# Patient Record
Sex: MALE | Race: WHITE | NOT HISPANIC OR LATINO
[De-identification: names, ages, dates, MRNs, and addresses within clinical notes are randomized per-mention and may not be internally consistent; named-entity substitution may affect disease eponyms.]

---

## 2019-09-12 ENCOUNTER — RX RENEWAL (OUTPATIENT)
Age: 79
End: 2019-09-12

## 2019-11-11 ENCOUNTER — NON-APPOINTMENT (OUTPATIENT)
Age: 79
End: 2019-11-11

## 2019-11-11 ENCOUNTER — APPOINTMENT (OUTPATIENT)
Dept: INTERNAL MEDICINE | Facility: CLINIC | Age: 79
End: 2019-11-11
Payer: MEDICARE

## 2019-11-11 VITALS — WEIGHT: 175 LBS | HEIGHT: 72 IN | TEMPERATURE: 97.8 F | BODY MASS INDEX: 23.7 KG/M2

## 2019-11-11 VITALS — DIASTOLIC BLOOD PRESSURE: 70 MMHG | SYSTOLIC BLOOD PRESSURE: 120 MMHG

## 2019-11-11 DIAGNOSIS — Z87.891 PERSONAL HISTORY OF NICOTINE DEPENDENCE: ICD-10-CM

## 2019-11-11 PROCEDURE — G0439: CPT

## 2019-11-11 PROCEDURE — 93000 ELECTROCARDIOGRAM COMPLETE: CPT

## 2019-11-11 PROCEDURE — 82270 OCCULT BLOOD FECES: CPT

## 2019-11-11 PROCEDURE — 36415 COLL VENOUS BLD VENIPUNCTURE: CPT

## 2019-11-11 NOTE — HISTORY OF PRESENT ILLNESS
[de-identified] : 79 M for cpe. Tx'ed for BPH borderlne DM gum d and is on asa. Long standing dysphagia w solids on occas only. ROS otherwise neg. The EKG shows LVH and non sp ST-T changes. Will get old stress test

## 2019-11-11 NOTE — HEALTH RISK ASSESSMENT
[With Patient/Caregiver] : With Patient/Caregiver [Relationship: ___] : Relationship: [unfilled] [I will adhere to the patient's wishes as expressed in the advance directive except as noted below.] : I will adhere to the patient's wishes as expressed in the advance directive except as noted below [AdvancecareDate] : 11/11/19

## 2019-11-11 NOTE — PHYSICAL EXAM
[Thin] : thin [Normal Male:] : meatus normal, the bladder was normal on palpation, the scrotum was normal, there were no testicular masses and no prostate nodules. [Normal] : normal gait, coordination grossly intact, no focal deficits [FreeTextEntry1] : guaiac neg/ bph mild [de-identified] : no adenopathy [de-identified] : degen arthritis [de-identified] : multiple sub-q nodules arms and abd.

## 2019-11-13 LAB
ALBUMIN SERPL ELPH-MCNC: 4.3 G/DL
ALP BLD-CCNC: 60 U/L
ALT SERPL-CCNC: 10 U/L
ANION GAP SERPL CALC-SCNC: 13 MMOL/L
APPEARANCE: CLEAR
AST SERPL-CCNC: 14 U/L
BACTERIA: NEGATIVE
BASOPHILS # BLD AUTO: 0.04 K/UL
BASOPHILS NFR BLD AUTO: 0.7 %
BILIRUB SERPL-MCNC: 0.5 MG/DL
BILIRUBIN URINE: NEGATIVE
BLOOD URINE: NEGATIVE
BUN SERPL-MCNC: 21 MG/DL
CALCIUM SERPL-MCNC: 9.5 MG/DL
CHLORIDE SERPL-SCNC: 104 MMOL/L
CHOLEST SERPL-MCNC: 190 MG/DL
CHOLEST/HDLC SERPL: 3.9 RATIO
CO2 SERPL-SCNC: 24 MMOL/L
COLOR: YELLOW
CREAT SERPL-MCNC: 1.16 MG/DL
EOSINOPHIL # BLD AUTO: 0.23 K/UL
EOSINOPHIL NFR BLD AUTO: 4 %
ESTIMATED AVERAGE GLUCOSE: 131 MG/DL
GLUCOSE QUALITATIVE U: NEGATIVE
GLUCOSE SERPL-MCNC: 147 MG/DL
HBA1C MFR BLD HPLC: 6.2 %
HCT VFR BLD CALC: 45.8 %
HDLC SERPL-MCNC: 49 MG/DL
HGB BLD-MCNC: 14.8 G/DL
HYALINE CASTS: 4 /LPF
IMM GRANULOCYTES NFR BLD AUTO: 0.4 %
KETONES URINE: NEGATIVE
LDLC SERPL CALC-MCNC: 125 MG/DL
LEUKOCYTE ESTERASE URINE: NEGATIVE
LYMPHOCYTES # BLD AUTO: 0.81 K/UL
LYMPHOCYTES NFR BLD AUTO: 14.3 %
MAN DIFF?: NORMAL
MCHC RBC-ENTMCNC: 31.1 PG
MCHC RBC-ENTMCNC: 32.3 GM/DL
MCV RBC AUTO: 96.2 FL
MICROSCOPIC-UA: NORMAL
MONOCYTES # BLD AUTO: 0.68 K/UL
MONOCYTES NFR BLD AUTO: 12 %
NEUTROPHILS # BLD AUTO: 3.9 K/UL
NEUTROPHILS NFR BLD AUTO: 68.6 %
NITRITE URINE: NEGATIVE
PH URINE: 6.5
PLATELET # BLD AUTO: 190 K/UL
POTASSIUM SERPL-SCNC: 4.3 MMOL/L
PROT SERPL-MCNC: 6.4 G/DL
PROTEIN URINE: NEGATIVE
RBC # BLD: 4.76 M/UL
RBC # FLD: 12.2 %
RED BLOOD CELLS URINE: 2 /HPF
SODIUM SERPL-SCNC: 141 MMOL/L
SPECIFIC GRAVITY URINE: 1.02
SQUAMOUS EPITHELIAL CELLS: 1 /HPF
TRIGL SERPL-MCNC: 81 MG/DL
UROBILINOGEN URINE: NORMAL
WBC # FLD AUTO: 5.68 K/UL
WHITE BLOOD CELLS URINE: 0 /HPF

## 2019-11-27 ENCOUNTER — APPOINTMENT (OUTPATIENT)
Dept: INTERNAL MEDICINE | Facility: CLINIC | Age: 79
End: 2019-11-27
Payer: MEDICARE

## 2019-11-27 VITALS
TEMPERATURE: 97.9 F | HEIGHT: 72 IN | OXYGEN SATURATION: 95 % | HEART RATE: 67 BPM | BODY MASS INDEX: 22.35 KG/M2 | WEIGHT: 165 LBS

## 2019-11-27 PROCEDURE — 99213 OFFICE O/P EST LOW 20 MIN: CPT

## 2019-11-27 NOTE — PHYSICAL EXAM
[No Acute Distress] : no acute distress [Well Nourished] : well nourished [Well Developed] : well developed [Well-Appearing] : well-appearing [Normal Sclera/Conjunctiva] : normal sclera/conjunctiva [PERRL] : pupils equal round and reactive to light [EOMI] : extraocular movements intact [Normal Outer Ear/Nose] : the outer ears and nose were normal in appearance [Normal Oropharynx] : the oropharynx was normal [No JVD] : no jugular venous distention [No Lymphadenopathy] : no lymphadenopathy [Supple] : supple [Thyroid Normal, No Nodules] : the thyroid was normal and there were no nodules present [No Respiratory Distress] : no respiratory distress  [No Accessory Muscle Use] : no accessory muscle use [Clear to Auscultation] : lungs were clear to auscultation bilaterally [Normal Rate] : normal rate  [Regular Rhythm] : with a regular rhythm [Normal S1, S2] : normal S1 and S2 [No Murmur] : no murmur heard [No Carotid Bruits] : no carotid bruits [No Abdominal Bruit] : a ~M bruit was not heard ~T in the abdomen [No Varicosities] : no varicosities [Pedal Pulses Present] : the pedal pulses are present [No Edema] : there was no peripheral edema [No Palpable Aorta] : no palpable aorta [No Extremity Clubbing/Cyanosis] : no extremity clubbing/cyanosis [Soft] : abdomen soft [Non Tender] : non-tender [Non-distended] : non-distended [No Masses] : no abdominal mass palpated [No HSM] : no HSM [Normal Bowel Sounds] : normal bowel sounds [Normal Posterior Cervical Nodes] : no posterior cervical lymphadenopathy [Normal Anterior Cervical Nodes] : no anterior cervical lymphadenopathy [No CVA Tenderness] : no CVA  tenderness [No Spinal Tenderness] : no spinal tenderness [No Joint Swelling] : no joint swelling [Grossly Normal Strength/Tone] : grossly normal strength/tone [No Rash] : no rash [Coordination Grossly Intact] : coordination grossly intact [No Focal Deficits] : no focal deficits [Normal Gait] : normal gait [Deep Tendon Reflexes (DTR)] : deep tendon reflexes were 2+ and symmetric [Normal Affect] : the affect was normal [Normal Insight/Judgement] : insight and judgment were intact [de-identified] : Multiple large subcutaneous nodules of the abdomen and arms without

## 2019-11-27 NOTE — ASSESSMENT
[FreeTextEntry1] : The patient is a diabetic although he has lost weight and been on a diet and his home sugars are improved markedly. I will start him on Crestor 5 mg at night and side effects of muscle aches and pains and liver were described to the patient and he will take OQ 10 200 mg as well. At this time he does get leg cramps at night

## 2019-11-27 NOTE — HISTORY OF PRESENT ILLNESS
[de-identified] : The patient is an 79-year-old male who has had to have high sugar on physical examination. He is gema baby aspirin daily and we want to put him on a statin. He has checked his sugars at home and been on a die tand his sugars have come down to normal range. I will have him start Crestor 5 mg at nightand be seen in one month

## 2020-01-13 ENCOUNTER — APPOINTMENT (OUTPATIENT)
Dept: INTERNAL MEDICINE | Facility: CLINIC | Age: 80
End: 2020-01-13
Payer: MEDICARE

## 2020-01-13 VITALS
HEIGHT: 72 IN | DIASTOLIC BLOOD PRESSURE: 80 MMHG | TEMPERATURE: 97.6 F | HEART RATE: 56 BPM | BODY MASS INDEX: 22.75 KG/M2 | SYSTOLIC BLOOD PRESSURE: 120 MMHG | WEIGHT: 168 LBS | OXYGEN SATURATION: 92 %

## 2020-01-13 PROCEDURE — 36415 COLL VENOUS BLD VENIPUNCTURE: CPT

## 2020-01-13 PROCEDURE — 99214 OFFICE O/P EST MOD 30 MIN: CPT | Mod: 25

## 2020-01-13 RX ORDER — UBIDECARENONE 200 MG
200 CAPSULE ORAL
Qty: 90 | Refills: 0 | Status: ACTIVE | COMMUNITY
Start: 2020-01-13

## 2020-01-13 NOTE — ASSESSMENT
[FreeTextEntry1] : checking diabetic parameters, checking lipids and CPK The patient is to be doing well on the current regimen and his weight is stabilized at 175. He will see ophthalmology yearly

## 2020-01-13 NOTE — HISTORY OF PRESENT ILLNESS
[de-identified] : 79 M form diabetic ck. Seen by optho--early cataracts, no diabetic change. NO polydypsia,polyuria, diet and wt 175 stable. No CP, palp, syncope,edema ,dyspsnea. No GI or neuro sympt. No0 neuropathy. Sugars 120's. Noleg cramps or mm pain

## 2020-01-13 NOTE — PHYSICAL EXAM
[No Acute Distress] : no acute distress [Well Nourished] : well nourished [Normal Sclera/Conjunctiva] : normal sclera/conjunctiva [Well Developed] : well developed [Well-Appearing] : well-appearing [EOMI] : extraocular movements intact [PERRL] : pupils equal round and reactive to light [Normal Outer Ear/Nose] : the outer ears and nose were normal in appearance [No JVD] : no jugular venous distention [Normal Oropharynx] : the oropharynx was normal [No Lymphadenopathy] : no lymphadenopathy [Supple] : supple [Thyroid Normal, No Nodules] : the thyroid was normal and there were no nodules present [No Respiratory Distress] : no respiratory distress  [No Accessory Muscle Use] : no accessory muscle use [Clear to Auscultation] : lungs were clear to auscultation bilaterally [Normal Rate] : normal rate  [Regular Rhythm] : with a regular rhythm [Normal S1, S2] : normal S1 and S2 [No Murmur] : no murmur heard [No Carotid Bruits] : no carotid bruits [No Abdominal Bruit] : a ~M bruit was not heard ~T in the abdomen [No Varicosities] : no varicosities [Pedal Pulses Present] : the pedal pulses are present [No Edema] : there was no peripheral edema [No Palpable Aorta] : no palpable aorta [No Extremity Clubbing/Cyanosis] : no extremity clubbing/cyanosis [Soft] : abdomen soft [Non Tender] : non-tender [Non-distended] : non-distended [No Masses] : no abdominal mass palpated [No HSM] : no HSM [Normal Bowel Sounds] : normal bowel sounds [Normal Posterior Cervical Nodes] : no posterior cervical lymphadenopathy [Normal Anterior Cervical Nodes] : no anterior cervical lymphadenopathy [No CVA Tenderness] : no CVA  tenderness [No Spinal Tenderness] : no spinal tenderness [No Joint Swelling] : no joint swelling [No Rash] : no rash [Grossly Normal Strength/Tone] : grossly normal strength/tone [No Focal Deficits] : no focal deficits [Coordination Grossly Intact] : coordination grossly intact [Normal Gait] : normal gait [Normal Affect] : the affect was normal [Deep Tendon Reflexes (DTR)] : deep tendon reflexes were 2+ and symmetric [Normal Insight/Judgement] : insight and judgment were intact [Normal] : affect was normal and insight and judgment were intact [de-identified] : all neg but 0/;0 reflexes at ankles [FreeTextEntry1] : deferred [de-identified] : boweling finger deformed

## 2020-01-14 LAB
ALBUMIN SERPL ELPH-MCNC: 4.5 G/DL
ALP BLD-CCNC: 71 U/L
ALT SERPL-CCNC: 13 U/L
ANION GAP SERPL CALC-SCNC: 12 MMOL/L
APPEARANCE: CLEAR
AST SERPL-CCNC: 18 U/L
BACTERIA: NEGATIVE
BASOPHILS # BLD AUTO: 0.04 K/UL
BASOPHILS NFR BLD AUTO: 0.6 %
BILIRUB SERPL-MCNC: 0.9 MG/DL
BILIRUBIN URINE: NEGATIVE
BLOOD URINE: NORMAL
BUN SERPL-MCNC: 23 MG/DL
CALCIUM SERPL-MCNC: 9.6 MG/DL
CHLORIDE SERPL-SCNC: 104 MMOL/L
CHOLEST SERPL-MCNC: 120 MG/DL
CHOLEST/HDLC SERPL: 2.6 RATIO
CK SERPL-CCNC: 128 U/L
CO2 SERPL-SCNC: 25 MMOL/L
COLOR: YELLOW
CREAT SERPL-MCNC: 1.2 MG/DL
EOSINOPHIL # BLD AUTO: 0.15 K/UL
EOSINOPHIL NFR BLD AUTO: 2.3 %
ESTIMATED AVERAGE GLUCOSE: 134 MG/DL
GLUCOSE QUALITATIVE U: NEGATIVE
GLUCOSE SERPL-MCNC: 126 MG/DL
HBA1C MFR BLD HPLC: 6.3 %
HCT VFR BLD CALC: 46.6 %
HDLC SERPL-MCNC: 46 MG/DL
HGB BLD-MCNC: 15.5 G/DL
HYALINE CASTS: 2 /LPF
IMM GRANULOCYTES NFR BLD AUTO: 0.2 %
KETONES URINE: ABNORMAL
LDLC SERPL CALC-MCNC: 59 MG/DL
LEUKOCYTE ESTERASE URINE: NEGATIVE
LYMPHOCYTES # BLD AUTO: 0.95 K/UL
LYMPHOCYTES NFR BLD AUTO: 14.4 %
MAN DIFF?: NORMAL
MCHC RBC-ENTMCNC: 31.8 PG
MCHC RBC-ENTMCNC: 33.3 GM/DL
MCV RBC AUTO: 95.5 FL
MICROSCOPIC-UA: NORMAL
MONOCYTES # BLD AUTO: 0.67 K/UL
MONOCYTES NFR BLD AUTO: 10.2 %
NEUTROPHILS # BLD AUTO: 4.76 K/UL
NEUTROPHILS NFR BLD AUTO: 72.3 %
NITRITE URINE: NEGATIVE
PH URINE: 6
PLATELET # BLD AUTO: 195 K/UL
POTASSIUM SERPL-SCNC: 4.5 MMOL/L
PROT SERPL-MCNC: 6.7 G/DL
PROTEIN URINE: NEGATIVE
RBC # BLD: 4.88 M/UL
RBC # FLD: 12.5 %
RED BLOOD CELLS URINE: 3 /HPF
SODIUM SERPL-SCNC: 141 MMOL/L
SPECIFIC GRAVITY URINE: 1.02
SQUAMOUS EPITHELIAL CELLS: 2 /HPF
TRIGL SERPL-MCNC: 75 MG/DL
UROBILINOGEN URINE: NORMAL
WBC # FLD AUTO: 6.58 K/UL
WHITE BLOOD CELLS URINE: 1 /HPF

## 2020-04-15 DIAGNOSIS — Z83.3 FAMILY HISTORY OF DIABETES MELLITUS: ICD-10-CM

## 2020-07-15 ENCOUNTER — RX RENEWAL (OUTPATIENT)
Age: 80
End: 2020-07-15

## 2020-11-12 ENCOUNTER — NON-APPOINTMENT (OUTPATIENT)
Age: 80
End: 2020-11-12

## 2020-11-12 ENCOUNTER — APPOINTMENT (OUTPATIENT)
Dept: INTERNAL MEDICINE | Facility: CLINIC | Age: 80
End: 2020-11-12
Payer: MEDICARE

## 2020-11-12 VITALS
HEIGHT: 72 IN | HEART RATE: 57 BPM | WEIGHT: 166 LBS | BODY MASS INDEX: 22.48 KG/M2 | TEMPERATURE: 97.9 F | OXYGEN SATURATION: 96 %

## 2020-11-12 VITALS — SYSTOLIC BLOOD PRESSURE: 138 MMHG | DIASTOLIC BLOOD PRESSURE: 60 MMHG

## 2020-11-12 DIAGNOSIS — Z23 ENCOUNTER FOR IMMUNIZATION: ICD-10-CM

## 2020-11-12 PROCEDURE — 82270 OCCULT BLOOD FECES: CPT

## 2020-11-12 PROCEDURE — G0439: CPT

## 2020-11-12 PROCEDURE — 93000 ELECTROCARDIOGRAM COMPLETE: CPT

## 2020-11-12 PROCEDURE — 36415 COLL VENOUS BLD VENIPUNCTURE: CPT

## 2020-11-12 NOTE — HISTORY OF PRESENT ILLNESS
[de-identified] : 80 M for CPE. Borderline glycemia, hyperlipidemia, BPH - has otherwise been healthy. Checking sugar to 140 but not fasting. No 3 P"s. Active with boweling, model planes, fishing. No CP sympt. GI neg.  -nocturria and mild decreased stream. Wt loss over a # of yrs, now stabilized over 6 mon. All vaccinations. Feels well

## 2020-11-12 NOTE — ASSESSMENT
[FreeTextEntry1] : Pt will get a # of home BP reading as was labile between 160/80 -140/80. Ck'ing glu HgbA1c, lipids. Ekg sinus donna otherwise normal.

## 2020-11-12 NOTE — PHYSICAL EXAM
[Thin] : thin [Normal Male:] : meatus normal, the bladder was normal on palpation, the scrotum was normal, there were no testicular masses and no prostate nodules. [Normal] : normal gait, coordination grossly intact, no focal deficits [Comprehensive Foot Exam Normal] : Right and left foot were examined and both feet are normal. No ulcers in either foot. Toes are normal and with full ROM.  Normal tactile sensation with monofilament testing throughout both feet [de-identified] : vi [FreeTextEntry1] : guaiac neg, no masses, prostate mildly enlarged w/o nodules [de-identified] : JOAQUIN ferrer [de-identified] : neg [de-identified] : neg [de-identified] : multiple large sub -q nodules arms and abd -unchanged soft regular [de-identified] : normal affect [de-identified] : callus sole R foot

## 2020-11-13 LAB
ALBUMIN SERPL ELPH-MCNC: 4.4 G/DL
ALP BLD-CCNC: 60 U/L
ALT SERPL-CCNC: 10 U/L
ANION GAP SERPL CALC-SCNC: 10 MMOL/L
APPEARANCE: CLEAR
AST SERPL-CCNC: 18 U/L
BACTERIA: NEGATIVE
BASOPHILS # BLD AUTO: 0.02 K/UL
BASOPHILS NFR BLD AUTO: 0.4 %
BILIRUB SERPL-MCNC: 0.7 MG/DL
BILIRUBIN URINE: NEGATIVE
BLOOD URINE: NEGATIVE
BUN SERPL-MCNC: 23 MG/DL
CALCIUM SERPL-MCNC: 9.1 MG/DL
CHLORIDE SERPL-SCNC: 103 MMOL/L
CHOLEST SERPL-MCNC: 147 MG/DL
CK SERPL-CCNC: 121 U/L
CO2 SERPL-SCNC: 27 MMOL/L
COLOR: YELLOW
CREAT SERPL-MCNC: 0.95 MG/DL
CRP SERPL HS-MCNC: 2.92 MG/L
EOSINOPHIL # BLD AUTO: 0.16 K/UL
EOSINOPHIL NFR BLD AUTO: 3.4 %
ESTIMATED AVERAGE GLUCOSE: 123 MG/DL
GLUCOSE QUALITATIVE U: NEGATIVE
GLUCOSE SERPL-MCNC: 127 MG/DL
HBA1C MFR BLD HPLC: 5.9 %
HCT VFR BLD CALC: 46 %
HDLC SERPL-MCNC: 56 MG/DL
HGB BLD-MCNC: 14.8 G/DL
HYALINE CASTS: 0 /LPF
IMM GRANULOCYTES NFR BLD AUTO: 0.2 %
KETONES URINE: NEGATIVE
LDLC SERPL CALC-MCNC: 80 MG/DL
LEUKOCYTE ESTERASE URINE: NEGATIVE
LYMPHOCYTES # BLD AUTO: 0.9 K/UL
LYMPHOCYTES NFR BLD AUTO: 19.1 %
MAN DIFF?: NORMAL
MCHC RBC-ENTMCNC: 32.2 GM/DL
MCHC RBC-ENTMCNC: 32.2 PG
MCV RBC AUTO: 100 FL
MICROSCOPIC-UA: NORMAL
MONOCYTES # BLD AUTO: 0.56 K/UL
MONOCYTES NFR BLD AUTO: 11.9 %
NEUTROPHILS # BLD AUTO: 3.06 K/UL
NEUTROPHILS NFR BLD AUTO: 65 %
NITRITE URINE: NEGATIVE
NONHDLC SERPL-MCNC: 92 MG/DL
PH URINE: 7.5
PLATELET # BLD AUTO: 166 K/UL
POTASSIUM SERPL-SCNC: 5.1 MMOL/L
PROT SERPL-MCNC: 6.4 G/DL
PROTEIN URINE: NEGATIVE
RBC # BLD: 4.6 M/UL
RBC # FLD: 12.5 %
RED BLOOD CELLS URINE: 5 /HPF
SODIUM SERPL-SCNC: 140 MMOL/L
SPECIFIC GRAVITY URINE: 1.02
SQUAMOUS EPITHELIAL CELLS: 0 /HPF
T4 SERPL-MCNC: 4.8 UG/DL
TRIGL SERPL-MCNC: 60 MG/DL
TSH SERPL-ACNC: 2.48 UIU/ML
UROBILINOGEN URINE: ABNORMAL
WBC # FLD AUTO: 4.71 K/UL
WHITE BLOOD CELLS URINE: 0 /HPF

## 2021-06-09 ENCOUNTER — RX RENEWAL (OUTPATIENT)
Age: 81
End: 2021-06-09

## 2021-07-29 ENCOUNTER — NON-APPOINTMENT (OUTPATIENT)
Age: 81
End: 2021-07-29

## 2021-07-29 ENCOUNTER — APPOINTMENT (OUTPATIENT)
Dept: INTERNAL MEDICINE | Facility: CLINIC | Age: 81
End: 2021-07-29
Payer: MEDICARE

## 2021-07-29 VITALS
OXYGEN SATURATION: 98 % | WEIGHT: 165 LBS | DIASTOLIC BLOOD PRESSURE: 70 MMHG | HEIGHT: 72 IN | BODY MASS INDEX: 22.35 KG/M2 | TEMPERATURE: 97.6 F | HEART RATE: 52 BPM | SYSTOLIC BLOOD PRESSURE: 120 MMHG

## 2021-07-29 DIAGNOSIS — Z01.818 ENCOUNTER FOR OTHER PREPROCEDURAL EXAMINATION: ICD-10-CM

## 2021-07-29 PROCEDURE — 99215 OFFICE O/P EST HI 40 MIN: CPT | Mod: 25

## 2021-07-29 PROCEDURE — 36415 COLL VENOUS BLD VENIPUNCTURE: CPT

## 2021-07-29 PROCEDURE — 93000 ELECTROCARDIOGRAM COMPLETE: CPT

## 2021-07-29 NOTE — PHYSICAL EXAM
[Normal Male:] : meatus normal, the bladder was normal on palpation, the scrotum was normal, there were no testicular masses and no prostate nodules. [Normal] : normal gait, coordination grossly intact, no focal deficits [Comprehensive Foot Exam Normal] : Right and left foot were examined and both feet are normal. No ulcers in either foot. Toes are normal and with full ROM.  Normal tactile sensation with monofilament testing throughout both feet [FreeTextEntry1] : Guaiac negative the prostate is enlarged but there are no nodules [de-identified] : Adenopathy [de-identified] : Thyroid is not palpable [de-identified] : No subcutaneous nodules of the trunk and arms

## 2021-07-29 NOTE — ASSESSMENT
[Patient Optimized for Surgery] : Patient optimized for surgery [ECG] : ECG [Modify anti-platelet treatment prior to procedure] : Modify anti-platelet treatment prior to procedure [Continue medications as is] : Continue current medications [FreeTextEntry4] : No surgeries.  He is to take all medications except for aspirin which we will hold for 1 week.  He will do the same for over-the-counter medications and vitamins. [FreeTextEntry6] : Vascular: Weak [FreeTextEntry7] : Follow-up with a of surgery

## 2021-07-29 NOTE — HISTORY OF PRESENT ILLNESS
[No Pertinent Cardiac History] : no history of aortic stenosis, atrial fibrillation, coronary artery disease, recent myocardial infarction, or implantable device/pacemaker [No Pertinent Pulmonary History] : no history of asthma, COPD, sleep apnea, or smoking [No Adverse Anesthesia Reaction] : no adverse anesthesia reaction in self or family member [Diabetes] : diabetes [Chronic Anticoagulation] : no chronic anticoagulation [Chronic Kidney Disease] : no chronic kidney disease [FreeTextEntry1] : Cataracts [FreeTextEntry4] : The patient is an 81-year-old male comes in for preop clearance for cataract.  A form was filled out for [facility].  Patient is asymptomatic [FreeTextEntry5] : Patient has mild diabetes and has no medications and has no polydipsia polyuria polyphagia [FreeTextEntry7] : EKG

## 2021-07-30 LAB
ESTIMATED AVERAGE GLUCOSE: 128 MG/DL
HBA1C MFR BLD HPLC: 6.1 %

## 2021-10-21 ENCOUNTER — RX RENEWAL (OUTPATIENT)
Age: 81
End: 2021-10-21

## 2021-11-17 ENCOUNTER — APPOINTMENT (OUTPATIENT)
Dept: INTERNAL MEDICINE | Facility: CLINIC | Age: 81
End: 2021-11-17
Payer: MEDICARE

## 2021-11-17 ENCOUNTER — NON-APPOINTMENT (OUTPATIENT)
Age: 81
End: 2021-11-17

## 2021-11-17 VITALS
SYSTOLIC BLOOD PRESSURE: 125 MMHG | HEIGHT: 72 IN | BODY MASS INDEX: 22.08 KG/M2 | TEMPERATURE: 97.9 F | DIASTOLIC BLOOD PRESSURE: 75 MMHG | WEIGHT: 163 LBS | OXYGEN SATURATION: 94 % | HEART RATE: 54 BPM

## 2021-11-17 DIAGNOSIS — R73.9 HYPERGLYCEMIA, UNSPECIFIED: ICD-10-CM

## 2021-11-17 PROCEDURE — G0439: CPT

## 2021-11-17 PROCEDURE — 93000 ELECTROCARDIOGRAM COMPLETE: CPT | Mod: 59

## 2021-11-17 PROCEDURE — 36415 COLL VENOUS BLD VENIPUNCTURE: CPT

## 2021-11-17 NOTE — PHYSICAL EXAM
[Thin] : thin [Normal Male:] : meatus normal, the bladder was normal on palpation, the scrotum was normal, there were no testicular masses and no prostate nodules. [Normal] : normal gait, coordination grossly intact, no focal deficits [FreeTextEntry1] : Guaiac is negative prostate is large but there are no nodules [de-identified] : Negative [de-identified] : Negative [de-identified] : Osteoarthritic or injury deformity of the fingers/multiple subcu nodules unchanged throughout his upper arms and abdomen [de-identified] : Subcu nodule see above

## 2021-11-17 NOTE — ASSESSMENT
[FreeTextEntry1] : The patient is found to be in good health we are checking his sugar hemoglobin A1c cholesterol CPK and liver functions as well as all screening tests EKG is done and shows sinus bradycardia and poor R wave progression but no change from previous cardiograms.  He is up-to-date on all vaccinations.  He appears to be doing well at this time

## 2021-11-17 NOTE — HISTORY OF PRESENT ILLNESS
[de-identified] : The patient is an 81-year-old male who comes in for complete physical examination.  He has been treated for hyperlipidemia as well as BPH but otherwise has been in good health.  He has symptoms of nocturia and hesitancy especially in the morning but no infection or hematuria.  He is moderately active flying lateral planes and bowling and has no chest pain palpitations swelling fainting or dyspnea.  He has no GI symptoms and specifically denies change of bowel blood in his stool or melena.  He has no focalizing weakness he is up-to-date on all immunizations.  He is generally feeling well but does note that his hearing has decreased.  He has not been checking his sugars but has been watching his diet and his weight is stable at 165

## 2021-11-18 LAB
ALBUMIN SERPL ELPH-MCNC: 4.5 G/DL
ALP BLD-CCNC: 63 U/L
ALT SERPL-CCNC: 11 U/L
ANION GAP SERPL CALC-SCNC: 14 MMOL/L
APPEARANCE: CLEAR
AST SERPL-CCNC: 17 U/L
BACTERIA: NEGATIVE
BASOPHILS # BLD AUTO: 0.03 K/UL
BASOPHILS NFR BLD AUTO: 0.4 %
BILIRUB SERPL-MCNC: 0.6 MG/DL
BILIRUBIN URINE: NEGATIVE
BLOOD URINE: NEGATIVE
BUN SERPL-MCNC: 21 MG/DL
CALCIUM SERPL-MCNC: 9.3 MG/DL
CHLORIDE SERPL-SCNC: 104 MMOL/L
CHOLEST SERPL-MCNC: 138 MG/DL
CK SERPL-CCNC: 102 U/L
CO2 SERPL-SCNC: 23 MMOL/L
COLOR: YELLOW
CREAT SERPL-MCNC: 1.1 MG/DL
CRP SERPL HS-MCNC: 2.02 MG/L
EOSINOPHIL # BLD AUTO: 0.12 K/UL
EOSINOPHIL NFR BLD AUTO: 1.7 %
ESTIMATED AVERAGE GLUCOSE: 126 MG/DL
GLUCOSE QUALITATIVE U: NEGATIVE
GLUCOSE SERPL-MCNC: 139 MG/DL
HBA1C MFR BLD HPLC: 6 %
HCT VFR BLD CALC: 44.8 %
HDLC SERPL-MCNC: 54 MG/DL
HGB BLD-MCNC: 14.4 G/DL
HYALINE CASTS: 0 /LPF
IMM GRANULOCYTES NFR BLD AUTO: 0.3 %
KETONES URINE: NEGATIVE
LDLC SERPL CALC-MCNC: 69 MG/DL
LEUKOCYTE ESTERASE URINE: NEGATIVE
LYMPHOCYTES # BLD AUTO: 0.82 K/UL
LYMPHOCYTES NFR BLD AUTO: 11.5 %
MAN DIFF?: NORMAL
MCHC RBC-ENTMCNC: 32.1 GM/DL
MCHC RBC-ENTMCNC: 32.1 PG
MCV RBC AUTO: 99.8 FL
MICROSCOPIC-UA: NORMAL
MONOCYTES # BLD AUTO: 0.85 K/UL
MONOCYTES NFR BLD AUTO: 11.9 %
NEUTROPHILS # BLD AUTO: 5.28 K/UL
NEUTROPHILS NFR BLD AUTO: 74.2 %
NITRITE URINE: NEGATIVE
NONHDLC SERPL-MCNC: 84 MG/DL
PH URINE: 7
PLATELET # BLD AUTO: 185 K/UL
POTASSIUM SERPL-SCNC: 4.4 MMOL/L
PROT SERPL-MCNC: 6.4 G/DL
PROTEIN URINE: NORMAL
RBC # BLD: 4.49 M/UL
RBC # FLD: 12.7 %
RED BLOOD CELLS URINE: 5 /HPF
SODIUM SERPL-SCNC: 140 MMOL/L
SPECIFIC GRAVITY URINE: 1.02
SQUAMOUS EPITHELIAL CELLS: 3 /HPF
TRIGL SERPL-MCNC: 77 MG/DL
TSH SERPL-ACNC: 3.59 UIU/ML
UROBILINOGEN URINE: ABNORMAL
WBC # FLD AUTO: 7.12 K/UL
WHITE BLOOD CELLS URINE: 1 /HPF

## 2021-11-29 DIAGNOSIS — J40 BRONCHITIS, NOT SPECIFIED AS ACUTE OR CHRONIC: ICD-10-CM

## 2022-04-21 ENCOUNTER — EMERGENCY (EMERGENCY)
Facility: HOSPITAL | Age: 82
LOS: 1 days | Discharge: HOME | End: 2022-04-21
Attending: EMERGENCY MEDICINE
Payer: MEDICARE

## 2022-04-21 VITALS
WEIGHT: 175.05 LBS | DIASTOLIC BLOOD PRESSURE: 91 MMHG | RESPIRATION RATE: 22 BRPM | SYSTOLIC BLOOD PRESSURE: 206 MMHG | HEART RATE: 87 BPM | OXYGEN SATURATION: 97 % | TEMPERATURE: 98 F

## 2022-04-21 VITALS
OXYGEN SATURATION: 97 % | RESPIRATION RATE: 18 BRPM | SYSTOLIC BLOOD PRESSURE: 169 MMHG | DIASTOLIC BLOOD PRESSURE: 84 MMHG | HEART RATE: 69 BPM | TEMPERATURE: 99 F

## 2022-04-21 DIAGNOSIS — Z98.89 OTHER SPECIFIED POSTPROCEDURAL STATES: Chronic | ICD-10-CM

## 2022-04-21 LAB
ALBUMIN SERPL ELPH-MCNC: 4.5 G/DL — SIGNIFICANT CHANGE UP (ref 3.3–5)
ALP SERPL-CCNC: 68 U/L — SIGNIFICANT CHANGE UP (ref 40–120)
ALT FLD-CCNC: 11 U/L — SIGNIFICANT CHANGE UP (ref 10–45)
ANION GAP SERPL CALC-SCNC: 17 MMOL/L — SIGNIFICANT CHANGE UP (ref 5–17)
APPEARANCE UR: CLEAR — SIGNIFICANT CHANGE UP
AST SERPL-CCNC: 24 U/L — SIGNIFICANT CHANGE UP (ref 10–40)
BACTERIA # UR AUTO: NEGATIVE — SIGNIFICANT CHANGE UP
BASOPHILS # BLD AUTO: 0.02 K/UL — SIGNIFICANT CHANGE UP (ref 0–0.2)
BASOPHILS NFR BLD AUTO: 0.3 % — SIGNIFICANT CHANGE UP (ref 0–2)
BILIRUB SERPL-MCNC: 0.8 MG/DL — SIGNIFICANT CHANGE UP (ref 0.2–1.2)
BILIRUB UR-MCNC: NEGATIVE — SIGNIFICANT CHANGE UP
BUN SERPL-MCNC: 22 MG/DL — SIGNIFICANT CHANGE UP (ref 7–23)
CALCIUM SERPL-MCNC: 9.5 MG/DL — SIGNIFICANT CHANGE UP (ref 8.4–10.5)
CHLORIDE SERPL-SCNC: 101 MMOL/L — SIGNIFICANT CHANGE UP (ref 96–108)
CO2 SERPL-SCNC: 20 MMOL/L — LOW (ref 22–31)
COLOR SPEC: SIGNIFICANT CHANGE UP
CREAT SERPL-MCNC: 1.14 MG/DL — SIGNIFICANT CHANGE UP (ref 0.5–1.3)
DIFF PNL FLD: ABNORMAL
EGFR: 64 ML/MIN/1.73M2 — SIGNIFICANT CHANGE UP
EOSINOPHIL # BLD AUTO: 0.01 K/UL — SIGNIFICANT CHANGE UP (ref 0–0.5)
EOSINOPHIL NFR BLD AUTO: 0.1 % — SIGNIFICANT CHANGE UP (ref 0–6)
EPI CELLS # UR: 0 /HPF — SIGNIFICANT CHANGE UP
GLUCOSE SERPL-MCNC: 170 MG/DL — HIGH (ref 70–99)
GLUCOSE UR QL: ABNORMAL
HCT VFR BLD CALC: 41.4 % — SIGNIFICANT CHANGE UP (ref 39–50)
HGB BLD-MCNC: 14.2 G/DL — SIGNIFICANT CHANGE UP (ref 13–17)
HYALINE CASTS # UR AUTO: 0 /LPF — SIGNIFICANT CHANGE UP (ref 0–2)
IMM GRANULOCYTES NFR BLD AUTO: 0.4 % — SIGNIFICANT CHANGE UP (ref 0–1.5)
KETONES UR-MCNC: ABNORMAL
LEUKOCYTE ESTERASE UR-ACNC: NEGATIVE — SIGNIFICANT CHANGE UP
LYMPHOCYTES # BLD AUTO: 0.43 K/UL — LOW (ref 1–3.3)
LYMPHOCYTES # BLD AUTO: 5.6 % — LOW (ref 13–44)
MCHC RBC-ENTMCNC: 32 PG — SIGNIFICANT CHANGE UP (ref 27–34)
MCHC RBC-ENTMCNC: 34.3 GM/DL — SIGNIFICANT CHANGE UP (ref 32–36)
MCV RBC AUTO: 93.2 FL — SIGNIFICANT CHANGE UP (ref 80–100)
MONOCYTES # BLD AUTO: 0.41 K/UL — SIGNIFICANT CHANGE UP (ref 0–0.9)
MONOCYTES NFR BLD AUTO: 5.4 % — SIGNIFICANT CHANGE UP (ref 2–14)
NEUTROPHILS # BLD AUTO: 6.73 K/UL — SIGNIFICANT CHANGE UP (ref 1.8–7.4)
NEUTROPHILS NFR BLD AUTO: 88.2 % — HIGH (ref 43–77)
NITRITE UR-MCNC: NEGATIVE — SIGNIFICANT CHANGE UP
NRBC # BLD: 0 /100 WBCS — SIGNIFICANT CHANGE UP (ref 0–0)
PH UR: 6.5 — SIGNIFICANT CHANGE UP (ref 5–8)
PLATELET # BLD AUTO: 179 K/UL — SIGNIFICANT CHANGE UP (ref 150–400)
POTASSIUM SERPL-MCNC: 4.2 MMOL/L — SIGNIFICANT CHANGE UP (ref 3.5–5.3)
POTASSIUM SERPL-SCNC: 4.2 MMOL/L — SIGNIFICANT CHANGE UP (ref 3.5–5.3)
PROT SERPL-MCNC: 6.9 G/DL — SIGNIFICANT CHANGE UP (ref 6–8.3)
PROT UR-MCNC: NEGATIVE — SIGNIFICANT CHANGE UP
RBC # BLD: 4.44 M/UL — SIGNIFICANT CHANGE UP (ref 4.2–5.8)
RBC # FLD: 11.9 % — SIGNIFICANT CHANGE UP (ref 10.3–14.5)
RBC CASTS # UR COMP ASSIST: 28 /HPF — HIGH (ref 0–4)
SODIUM SERPL-SCNC: 138 MMOL/L — SIGNIFICANT CHANGE UP (ref 135–145)
SP GR SPEC: 1.01 — SIGNIFICANT CHANGE UP (ref 1.01–1.02)
UROBILINOGEN FLD QL: NEGATIVE — SIGNIFICANT CHANGE UP
WBC # BLD: 7.63 K/UL — SIGNIFICANT CHANGE UP (ref 3.8–10.5)
WBC # FLD AUTO: 7.63 K/UL — SIGNIFICANT CHANGE UP (ref 3.8–10.5)
WBC UR QL: 0 /HPF — SIGNIFICANT CHANGE UP (ref 0–5)

## 2022-04-21 PROCEDURE — 51702 INSERT TEMP BLADDER CATH: CPT

## 2022-04-21 PROCEDURE — 99283 EMERGENCY DEPT VISIT LOW MDM: CPT | Mod: 25

## 2022-04-21 PROCEDURE — 87086 URINE CULTURE/COLONY COUNT: CPT

## 2022-04-21 PROCEDURE — 36415 COLL VENOUS BLD VENIPUNCTURE: CPT

## 2022-04-21 PROCEDURE — 80053 COMPREHEN METABOLIC PANEL: CPT

## 2022-04-21 PROCEDURE — 85025 COMPLETE CBC W/AUTO DIFF WBC: CPT

## 2022-04-21 PROCEDURE — 81001 URINALYSIS AUTO W/SCOPE: CPT

## 2022-04-21 PROCEDURE — 99284 EMERGENCY DEPT VISIT MOD MDM: CPT | Mod: 25,GC

## 2022-04-21 NOTE — ED PROVIDER NOTE - NSFOLLOWUPINSTRUCTIONS_ED_ALL_ED_FT
See UROLOGY CLINIC (rapid referral) this week for follow up -- call to discuss.    Stay well hydrated, eat regular healthy meals, get plenty of rest, continue current medications.    See [facility] information and return instructions given to you.    Seek immediate medical care for new/worsening symptoms/concerns.

## 2022-04-21 NOTE — ED PROVIDER NOTE - CARE PLAN
Principal Discharge DX:	Acute urinary obstruction   1 Principal Discharge DX:	Acute urinary obstruction  Secondary Diagnosis:	Asymptomatic hypertension

## 2022-04-21 NOTE — ED PROVIDER NOTE - ATTENDING CONTRIBUTION TO CARE
------------ATTENDING NOTE------------  pt w/ daughter c/o being unable to urinate since last night, pt has BPH and baseline hesitancy, no new medications/changes, no recent fevers/illness, c/o gradual onset of moderate dull suprapubic pressure since urinary retention relieved w/ pham catheter, awaiting labs and close reassessments -->  - Cristian Fischer MD   ------------------------------------------------

## 2022-04-21 NOTE — ED PROVIDER NOTE - NS ED ROS FT
Constitutional: no fevers, chills  HEENT: no cough, rhinorrhea  Cardiac: no chest pain, palpitations  Respiratory: no SOB  GI: no n/v, abd pain, bloody or dark stools  : difficulty urinating. no dysuria, frequency, or hematuria  MSK: no joint pain  Skin: no rashes  Neuro: no headache, change in vision, focal weakness  Psych: negative

## 2022-04-21 NOTE — ED PROVIDER NOTE - PHYSICAL EXAMINATION
General: non-toxic, NAD  HEENT: NCAT, PERRL  Cardiac: RRR, no murmurs, 2+ peripheral pulses  Chest: CTAB  Abdomen: soft, non-distended, bowel sounds present, no ttp, no rebound or guarding  Extremities: chronic varicose veins visible on both legs. no peripheral edema, calf tenderness, or leg size discrepancies  Skin: no rashes  Neuro: AAOx4, 5+motor, sensory grossly intact  Psych: mood and affect appropriate

## 2022-04-21 NOTE — ED PROVIDER NOTE - PATIENT PORTAL LINK FT
You can access the FollowMyHealth Patient Portal offered by [facility] by registering at the following website: [email or web address]. By joining [employer]s FollowMyHealth portal, you will also be able to view your health information using other applications (apps) compatible with our system.

## 2022-04-21 NOTE — ED PROVIDER NOTE - OBJECTIVE STATEMENT
83yo M pmhx of BPH, HLD comes to ED w/ difficulty urinating. Their pain/symptom is moderate to severe, located in lower abdomen, constant, non mediating with rest. Started randomly at 1900 yesterday 4/20/22. Denies fever, nausea, vomiting, dysuria, hematuria, urinary frequency, recent illness.

## 2022-04-21 NOTE — ED ADULT NURSE NOTE - NSIMPLEMENTINTERV_GEN_ALL_ED
Implemented All Fall Risk Interventions:  [place] to call system. Call bell, personal items and telephone within reach. Instruct patient to call for assistance. Room bathroom lighting operational. Non-slip footwear when patient is off stretcher. Physically safe environment: no spills, clutter or unnecessary equipment. Stretcher in lowest position, wheels locked, appropriate side rails in place. Provide visual cue, wrist band, yellow gown, etc. Monitor gait and stability. Monitor for mental status changes and reorient to person, place, and time. Review medications for side effects contributing to fall risk. Reinforce activity limits and safety measures with patient and family.

## 2022-04-21 NOTE — ED PROVIDER NOTE - PROGRESS NOTE DETAILS
Patient reports improvement on symptoms with catheter. Spoke to patient about results and lack of immediate emergent pathology at this time. Plan to discharge patient. Patient given urology follow up and return precautions. Patient agrees with plan.

## 2022-04-21 NOTE — ED PROVIDER NOTE - NSFOLLOWUPCLINICS_GEN_ALL_ED_FT
[facility] - Urology  Urology  [address], [address] [place] [address]  Phone: [phone]  Fax:     [facility]  Urology  [address] [place] [address] - [address] [place] [address]  Phone: [phone]  Fax:

## 2022-04-22 ENCOUNTER — APPOINTMENT (OUTPATIENT)
Dept: UROLOGY | Facility: CLINIC | Age: 82
End: 2022-04-22

## 2022-04-22 ENCOUNTER — NON-APPOINTMENT (OUTPATIENT)
Age: 82
End: 2022-04-22

## 2022-04-22 LAB
CULTURE RESULTS: NO GROWTH — SIGNIFICANT CHANGE UP
SPECIMEN SOURCE: SIGNIFICANT CHANGE UP

## 2022-04-22 RX ORDER — AZITHROMYCIN 250 MG/1
250 TABLET, FILM COATED ORAL
Qty: 1 | Refills: 1 | Status: COMPLETED | COMMUNITY
Start: 2021-11-29 | End: 2022-04-22

## 2022-04-27 ENCOUNTER — OUTPATIENT (OUTPATIENT)
Dept: OUTPATIENT SERVICES | Facility: HOSPITAL | Age: 82
LOS: 1 days | End: 2022-04-27
Payer: MEDICARE

## 2022-04-27 ENCOUNTER — APPOINTMENT (OUTPATIENT)
Dept: UROLOGY | Facility: CLINIC | Age: 82
End: 2022-04-27
Payer: MEDICARE

## 2022-04-27 ENCOUNTER — APPOINTMENT (OUTPATIENT)
Dept: UROLOGY | Facility: CLINIC | Age: 82
End: 2022-04-27

## 2022-04-27 VITALS
WEIGHT: 175 LBS | HEIGHT: 72 IN | TEMPERATURE: 97.5 F | RESPIRATION RATE: 16 BRPM | BODY MASS INDEX: 23.7 KG/M2 | DIASTOLIC BLOOD PRESSURE: 76 MMHG | HEART RATE: 59 BPM | SYSTOLIC BLOOD PRESSURE: 177 MMHG

## 2022-04-27 DIAGNOSIS — Z98.89 OTHER SPECIFIED POSTPROCEDURAL STATES: Chronic | ICD-10-CM

## 2022-04-27 PROCEDURE — 51700 IRRIGATION OF BLADDER: CPT

## 2022-04-27 PROCEDURE — 51798 US URINE CAPACITY MEASURE: CPT

## 2022-04-27 NOTE — PHYSICAL EXAM
[General Appearance - Well Developed] : well developed [General Appearance - Well Nourished] : well nourished [Normal Appearance] : normal appearance [Well Groomed] : well groomed [General Appearance - In No Acute Distress] : no acute distress [Edema] : no peripheral edema [Respiration, Rhythm And Depth] : normal respiratory rhythm and effort [Exaggerated Use Of Accessory Muscles For Inspiration] : no accessory muscle use [Abdomen Soft] : soft [Abdomen Tenderness] : non-tender [Costovertebral Angle Tenderness] : no ~M costovertebral angle tenderness [Urethral Meatus] : meatus normal [Urinary Bladder Findings] : the bladder was normal on palpation [Scrotum] : the scrotum was normal [Testes Mass (___cm)] : there were no testicular masses [No Prostate Nodules] : no prostate nodules [Normal Station and Gait] : the gait and station were normal for the patient's age [] : no rash [Oriented To Time, Place, And Person] : oriented to person, place, and time [Affect] : the affect was normal [Mood] : the mood was normal [Not Anxious] : not anxious

## 2022-04-29 DIAGNOSIS — N40.1 BENIGN PROSTATIC HYPERPLASIA WITH LOWER URINARY TRACT SYMPTOMS: ICD-10-CM

## 2022-04-29 DIAGNOSIS — R33.8 OTHER RETENTION OF URINE: ICD-10-CM

## 2022-04-29 NOTE — HISTORY OF PRESENT ILLNESS
[FreeTextEntry1] : 83yo gentleman with cc of urinary retention. Pt here today with his dtr Sharlene. He was at ED 4/21/22 for inability to void. Had pham placed with report of 500cc drained (pt reports 1L). UCx negative. Voids q 3 h day time and nocturia X 2-3 .\par \par At baseline, notes hesitancy in the am. otherwise minimal complaints. Reports he has been on finasteride for years. Wasn't sure it was "doing anything" and stopped it on his own a month or so ago. Restarted after ER visit. Had flomax added by us last week. \par \par Today he is here for a TOV. Today Instilled 250 cc sterile water via # 18 RRC tolerated ,balloon deflated , catheter removed tolerated, pt voided 200  ml , \par \par PVR 40 ml \par \par

## 2022-04-29 NOTE — REVIEW OF SYSTEMS
[Negative] : Heme/Lymph [see HPI] : see HPI [Constipation] : constipation [Hesitancy] : urinary hesitancy [Nocturia] : nocturia [Dysuria] : no dysuria [Incontinence] : no incontinence

## 2022-04-29 NOTE — ASSESSMENT
[FreeTextEntry1] : AUR. Likely baseline sx off meds at time of episode. \par --Cont finasteride\par --Cont flomax\par --Encourage fluid intake but refrain from drinking fluids 3 hours before  bedtime. \par --Address constipation  with fiber intake - fruits and vegetable servings 3-5 per day, use of daily Gummy fibers supplements if needed .\par --RTO in a week for PVR ,BINDU,UA,Microscopic analysis and culture. \par

## 2022-05-09 ENCOUNTER — APPOINTMENT (OUTPATIENT)
Dept: UROLOGY | Facility: CLINIC | Age: 82
End: 2022-05-09
Payer: MEDICARE

## 2022-05-09 VITALS
RESPIRATION RATE: 16 BRPM | WEIGHT: 175 LBS | HEIGHT: 72 IN | BODY MASS INDEX: 23.7 KG/M2 | DIASTOLIC BLOOD PRESSURE: 84 MMHG | HEART RATE: 56 BPM | SYSTOLIC BLOOD PRESSURE: 179 MMHG

## 2022-05-09 PROCEDURE — 99213 OFFICE O/P EST LOW 20 MIN: CPT

## 2022-05-09 NOTE — PHYSICAL EXAM
[General Appearance - Well Developed] : well developed [General Appearance - Well Nourished] : well nourished [Normal Appearance] : normal appearance [Well Groomed] : well groomed [General Appearance - In No Acute Distress] : no acute distress [Abdomen Soft] : soft [Abdomen Tenderness] : non-tender [Costovertebral Angle Tenderness] : no ~M costovertebral angle tenderness [Edema] : no peripheral edema [] : no respiratory distress [Respiration, Rhythm And Depth] : normal respiratory rhythm and effort [Exaggerated Use Of Accessory Muscles For Inspiration] : no accessory muscle use [Oriented To Time, Place, And Person] : oriented to person, place, and time [Affect] : the affect was normal [Mood] : the mood was normal [Not Anxious] : not anxious [Normal Station and Gait] : the gait and station were normal for the patient's age [FreeTextEntry1] : 50cc prostate, no discrete nodules

## 2022-05-09 NOTE — HISTORY OF PRESENT ILLNESS
[FreeTextEntry1] : 83yo gentleman with cc of urinary retention. Pt here today with his dtr Sharlene. He was at ED 4/21/22 for inability to void. Had pham placed with report of 500cc drained (pt reports 1L). UCx negative. Voids q 3 h day time and nocturia X 2-3 .At baseline, notes hesitancy in the am. otherwise minimal complaints. Reports he has been on finasteride for years. Wasn't sure it was "doing anything" and stopped it on his own a month or so ago. Restarted after ER visit. Had flomax added by us last week. Passed void trial over a week ago with PVR of 40\par \par Returns today for follow-up. Reports 1 day of difficulty but otherwise voiding well. PVR today only 19cc. \par \par No fam hx of prostate ca. Dad with "prostate issues". Reports he has had variable PSAs in the past and had biopsy x3 that were all negative. \par \par

## 2022-05-09 NOTE — ASSESSMENT
[FreeTextEntry1] : AUR. Likely baseline sx off meds at time of episode. \par --Cont finasteride and flomax\par --Encourage fluid intake but refrain from drinking fluids 3 hours before  bedtime. \par --Address constipation  with fiber intake - fruits and vegetable servings 3-5 per day, use of daily Gummy fibers supplements if needed .\par --UA, UCx\par --RTC in 1y\par

## 2022-05-12 ENCOUNTER — RX RENEWAL (OUTPATIENT)
Age: 82
End: 2022-05-12

## 2022-05-12 LAB
APPEARANCE: CLEAR
BACTERIA UR CULT: NORMAL
BACTERIA: NEGATIVE
BILIRUBIN URINE: NEGATIVE
BLOOD URINE: NEGATIVE
COLOR: NORMAL
GLUCOSE QUALITATIVE U: NEGATIVE
HYALINE CASTS: 1 /LPF
KETONES URINE: NEGATIVE
LEUKOCYTE ESTERASE URINE: NEGATIVE
MICROSCOPIC-UA: NORMAL
NITRITE URINE: NEGATIVE
PH URINE: 6.5
PROTEIN URINE: NORMAL
RED BLOOD CELLS URINE: 2 /HPF
SPECIFIC GRAVITY URINE: 1.02
SQUAMOUS EPITHELIAL CELLS: 1 /HPF
UROBILINOGEN URINE: NORMAL
WHITE BLOOD CELLS URINE: 1 /HPF

## 2022-06-24 ENCOUNTER — RX RENEWAL (OUTPATIENT)
Age: 82
End: 2022-06-24

## 2022-09-23 ENCOUNTER — RX RENEWAL (OUTPATIENT)
Age: 82
End: 2022-09-23

## 2022-12-05 ENCOUNTER — NON-APPOINTMENT (OUTPATIENT)
Age: 82
End: 2022-12-05

## 2022-12-05 ENCOUNTER — APPOINTMENT (OUTPATIENT)
Dept: INTERNAL MEDICINE | Facility: CLINIC | Age: 82
End: 2022-12-05

## 2022-12-05 VITALS — DIASTOLIC BLOOD PRESSURE: 80 MMHG | SYSTOLIC BLOOD PRESSURE: 135 MMHG

## 2022-12-05 VITALS
HEIGHT: 72 IN | TEMPERATURE: 97.9 F | OXYGEN SATURATION: 98 % | BODY MASS INDEX: 23.16 KG/M2 | HEART RATE: 51 BPM | WEIGHT: 171 LBS

## 2022-12-05 DIAGNOSIS — Z86.39 PERSONAL HISTORY OF OTHER ENDOCRINE, NUTRITIONAL AND METABOLIC DISEASE: ICD-10-CM

## 2022-12-05 DIAGNOSIS — Z00.00 ENCOUNTER FOR GENERAL ADULT MEDICAL EXAMINATION W/OUT ABNORMAL FINDINGS: ICD-10-CM

## 2022-12-05 PROCEDURE — G0439: CPT

## 2022-12-05 PROCEDURE — 82270 OCCULT BLOOD FECES: CPT

## 2022-12-05 PROCEDURE — 36415 COLL VENOUS BLD VENIPUNCTURE: CPT

## 2022-12-05 PROCEDURE — 93000 ELECTROCARDIOGRAM COMPLETE: CPT | Mod: 59

## 2022-12-05 RX ORDER — ASPIRIN 81 MG/1
81 TABLET ORAL DAILY
Qty: 30 | Refills: 0 | Status: DISCONTINUED | COMMUNITY
Start: 2019-11-11 | End: 2022-12-05

## 2022-12-06 LAB
ALBUMIN SERPL ELPH-MCNC: 4.6 G/DL
ALP BLD-CCNC: 66 U/L
ALT SERPL-CCNC: 10 U/L
ANION GAP SERPL CALC-SCNC: 11 MMOL/L
APPEARANCE: CLEAR
AST SERPL-CCNC: 18 U/L
BACTERIA: NEGATIVE
BASOPHILS # BLD AUTO: 0.02 K/UL
BASOPHILS NFR BLD AUTO: 0.4 %
BILIRUB SERPL-MCNC: 0.8 MG/DL
BILIRUBIN URINE: NEGATIVE
BLOOD URINE: ABNORMAL
BUN SERPL-MCNC: 23 MG/DL
CALCIUM SERPL-MCNC: 9.4 MG/DL
CHLORIDE SERPL-SCNC: 102 MMOL/L
CHOLEST SERPL-MCNC: 129 MG/DL
CK SERPL-CCNC: 118 U/L
CO2 SERPL-SCNC: 26 MMOL/L
COLOR: YELLOW
CREAT SERPL-MCNC: 1.1 MG/DL
CRP SERPL HS-MCNC: 2.03 MG/L
EGFR: 67 ML/MIN/1.73M2
EOSINOPHIL # BLD AUTO: 0.11 K/UL
EOSINOPHIL NFR BLD AUTO: 2.4 %
ESTIMATED AVERAGE GLUCOSE: 126 MG/DL
GLUCOSE QUALITATIVE U: NORMAL
GLUCOSE SERPL-MCNC: 100 MG/DL
HBA1C MFR BLD HPLC: 6 %
HCT VFR BLD CALC: 44.9 %
HDLC SERPL-MCNC: 51 MG/DL
HGB BLD-MCNC: 14.6 G/DL
HYALINE CASTS: 1 /LPF
IMM GRANULOCYTES NFR BLD AUTO: 0.4 %
KETONES URINE: NEGATIVE
LDLC SERPL CALC-MCNC: 67 MG/DL
LEUKOCYTE ESTERASE URINE: ABNORMAL
LYMPHOCYTES # BLD AUTO: 0.99 K/UL
LYMPHOCYTES NFR BLD AUTO: 21.9 %
MAN DIFF?: NORMAL
MCHC RBC-ENTMCNC: 32.1 PG
MCHC RBC-ENTMCNC: 32.5 GM/DL
MCV RBC AUTO: 98.7 FL
MICROSCOPIC-UA: NORMAL
MONOCYTES # BLD AUTO: 0.52 K/UL
MONOCYTES NFR BLD AUTO: 11.5 %
NEUTROPHILS # BLD AUTO: 2.87 K/UL
NEUTROPHILS NFR BLD AUTO: 63.4 %
NITRITE URINE: NEGATIVE
NONHDLC SERPL-MCNC: 78 MG/DL
PH URINE: 6
PLATELET # BLD AUTO: 185 K/UL
POTASSIUM SERPL-SCNC: 4.8 MMOL/L
PROT SERPL-MCNC: 6.6 G/DL
PROTEIN URINE: NORMAL
RBC # BLD: 4.55 M/UL
RBC # FLD: 12.3 %
RED BLOOD CELLS URINE: 4 /HPF
SODIUM SERPL-SCNC: 139 MMOL/L
SPECIFIC GRAVITY URINE: 1.02
SQUAMOUS EPITHELIAL CELLS: 1 /HPF
TRIGL SERPL-MCNC: 54 MG/DL
TSH SERPL-ACNC: 6.75 UIU/ML
UROBILINOGEN URINE: NORMAL
WBC # FLD AUTO: 4.53 K/UL
WHITE BLOOD CELLS URINE: 4 /HPF

## 2022-12-06 NOTE — PHYSICAL EXAM
[Normal Male:] : meatus normal, the bladder was normal on palpation, the scrotum was normal, there were no testicular masses and no prostate nodules. [Normal] : no joint swelling, grossly normal strength/tone [Comprehensive Foot Exam Normal] : Right and left foot were examined and both feet are normal. No ulcers in either foot. Toes are normal and with full ROM.  Normal tactile sensation with monofilament testing throughout both feet [de-identified] : Elderly male looking younger than his stated age with multiple subcu nodules [de-identified] : Progress [FreeTextEntry1] : pH without nodules moderate size guaiac is negative and there are no masses in the anal region or rectum [de-identified] : Procedure on the right [de-identified] : Thyroid not palpable [de-identified] : Multiple ecchymoses

## 2022-12-06 NOTE — HISTORY OF PRESENT ILLNESS
[de-identified] : The patient is a[age]-year-old male who comes in for complete physical examination.  He is treated for BPH and hyperlipidemia and I have asked him to stop his aspirin.  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  He has no GI symptoms that denies change of bowel blood in his stool or melena.  He has slow stream especially in the morning but much better since he is on medication.  He has no weakness numbness loss of balance or incoordination.  He has chronic subcutaneous nodules which do not bother him but have increased in size and number.  He is up-to-date on all vaccinations other than the recent COVID shot which she will get.  Overall he is feeling well

## 2022-12-06 NOTE — ASSESSMENT
[FreeTextEntry1] : The patient is checking his sugars and generally runs about 125.  He has no symptoms of diabetes and we are checking his hemoglobin A1c with the exception of his BPH he is doing well and we are checking his lipids CPK liver functions.  We have taken him off his aspirin.  He continues to do well and his cardiogram is normal with exception of unchanged sinus bradycardia at 50 and voltage criteria for LVH

## 2022-12-06 NOTE — HEALTH RISK ASSESSMENT
[0] : 2) Feeling down, depressed, or hopeless: Not at all (0) [PHQ-2 Negative - No further assessment needed] : PHQ-2 Negative - No further assessment needed [PBY4Pbtel] : 0

## 2023-05-03 ENCOUNTER — RX RENEWAL (OUTPATIENT)
Age: 83
End: 2023-05-03

## 2023-05-10 ENCOUNTER — APPOINTMENT (OUTPATIENT)
Dept: UROLOGY | Facility: CLINIC | Age: 83
End: 2023-05-10
Payer: MEDICARE

## 2023-05-10 PROCEDURE — 99213 OFFICE O/P EST LOW 20 MIN: CPT

## 2023-05-10 RX ORDER — CHOLECALCIFEROL (VITAMIN D3)
CRYSTALS MISCELLANEOUS
Refills: 0 | Status: DISCONTINUED | COMMUNITY
Start: 2019-11-11 | End: 2023-05-10

## 2023-05-10 NOTE — REVIEW OF SYSTEMS
[see HPI] : see HPI [Constipation] : constipation [Hesitancy] : urinary hesitancy [Nocturia] : nocturia [Negative] : Heme/Lymph [Dysuria] : no dysuria [Incontinence] : no incontinence

## 2023-05-10 NOTE — HISTORY OF PRESENT ILLNESS
[FreeTextEntry1] : 82yo gentleman with cc of urinary retention. Pt here today with his wife. He was at ED 4/21/22 for inability to void. Had pham placed with report of 500cc drained (pt reports 1L). UCx negative. He was able to pass this and his PVR was 19.  \par \par Voids q 3h day time and nocturia X 1-3. At baseline, notes hesitancy in the am. otherwise minimal complaints.  He has been on finasteride and flomax for the last year. \par \par Returns today for follow-up. Reports bother over the weekend. He was unable to void for about 6h. He woke sunday and then was able to. Had a cocktail that evening prior when this happened. No clear bowel contributor. \par \par No fam hx of prostate ca. Dad with "prostate issues". Reports he has had variable PSAs in the past and had biopsy x3 that were all negative. \par \par

## 2023-05-10 NOTE — PHYSICAL EXAM
[General Appearance - Well Developed] : well developed [General Appearance - Well Nourished] : well nourished [Normal Appearance] : normal appearance [Well Groomed] : well groomed [General Appearance - In No Acute Distress] : no acute distress [Abdomen Soft] : soft [Abdomen Tenderness] : non-tender [Costovertebral Angle Tenderness] : no ~M costovertebral angle tenderness [Edema] : no peripheral edema [] : no respiratory distress [Respiration, Rhythm And Depth] : normal respiratory rhythm and effort [Exaggerated Use Of Accessory Muscles For Inspiration] : no accessory muscle use [Oriented To Time, Place, And Person] : oriented to person, place, and time [Affect] : the affect was normal [Mood] : the mood was normal [Not Anxious] : not anxious [Normal Station and Gait] : the gait and station were normal for the patient's age [FreeTextEntry1] : PVR=0cc

## 2023-05-10 NOTE — ASSESSMENT
[FreeTextEntry1] : Hx of AUR. Recent bother but emptying well. \par --Cont finasteride and flomax\par --Encourage fluid intake but refrain from drinking fluids 3 hours before  bedtime. \par --Address constipation  with fiber intake - fruits and vegetable servings 3-5 per day, use of daily Gummy fibers supplements if needed!\par --RTC in 1y\par

## 2023-05-16 LAB
APPEARANCE: CLEAR
BACTERIA: NEGATIVE /HPF
BILIRUBIN URINE: NEGATIVE
BLOOD URINE: NEGATIVE
CAST: 0 /LPF
COLOR: YELLOW
EPITHELIAL CELLS: 1 /HPF
GLUCOSE QUALITATIVE U: NEGATIVE MG/DL
KETONES URINE: ABNORMAL MG/DL
LEUKOCYTE ESTERASE URINE: NEGATIVE
MICROSCOPIC-UA: NORMAL
NITRITE URINE: NEGATIVE
PH URINE: 7.5
PROTEIN URINE: NORMAL MG/DL
RED BLOOD CELLS URINE: 3 /HPF
SPECIFIC GRAVITY URINE: 1.02
UROBILINOGEN URINE: 1 MG/DL
WHITE BLOOD CELLS URINE: 0 /HPF

## 2023-09-20 NOTE — ED PROVIDER NOTE - CLINICAL SUMMARY MEDICAL DECISION MAKING FREE TEXT BOX
We did discuss she has had some breakthrough seizures.  We will get a Dilantin level checked and at this point she does not want to see neurology   see MD Note

## 2024-01-04 NOTE — REVIEW OF SYSTEMS
Javi Jeannelaura,     [address], [address]   [place], [address]   [phone]     01/18/2024 12:30:00 PM     Websites:  retrainpain.org/english    painrevolution.org (go to factsheet page, read all 9)    oregonpainguidance.org/paineducationtoolkit    HiperScan.Apothesource (resources tab, so many videos!)    If you're still interested:  toy.co/guides/how-to-use-tools-from-act-to-live-with-persistent-pain  Compass Engine.Apothesource/courses/the-[facility]-guide-to-pain-relief  thegoodbody.com  unlearnyourpain.Apothesource  backincontrol.com  tamethebeast.org  instituteforchronicpain.org  MySongToYou.com        YouTube videos     Why Things Hurt    Kacey Zapata     What is chronic pain and how does it work?   The Guardian    Learn More About Your Pain    Pain Revolution    Pain Depends on Context    Pain Revolution    The Mysterious Science of Pain    Mitchel Baron    Pain and the brain  Justina Ramsay    Understanding Pain in less than 5 minutes, and what to do about it!               [see HPI] : see HPI [Constipation] : constipation [Hesitancy] : urinary hesitancy [Nocturia] : nocturia [Negative] : Heme/Lymph [Dysuria] : no dysuria [Incontinence] : no incontinence

## 2024-03-18 ENCOUNTER — RX RENEWAL (OUTPATIENT)
Age: 84
End: 2024-03-18

## 2024-05-08 ENCOUNTER — APPOINTMENT (OUTPATIENT)
Dept: UROLOGY | Facility: CLINIC | Age: 84
End: 2024-05-08
Payer: MEDICARE

## 2024-05-08 VITALS
WEIGHT: 172 LBS | SYSTOLIC BLOOD PRESSURE: 128 MMHG | OXYGEN SATURATION: 97 % | HEART RATE: 55 BPM | BODY MASS INDEX: 23.33 KG/M2 | RESPIRATION RATE: 16 BRPM | DIASTOLIC BLOOD PRESSURE: 68 MMHG

## 2024-05-08 DIAGNOSIS — N40.1 BENIGN PROSTATIC HYPERPLASIA WITH LOWER URINARY TRACT SYMPMS: ICD-10-CM

## 2024-05-08 DIAGNOSIS — N13.8 BENIGN PROSTATIC HYPERPLASIA WITH LOWER URINARY TRACT SYMPMS: ICD-10-CM

## 2024-05-08 DIAGNOSIS — R33.8 OTHER RETENTION OF URINE: ICD-10-CM

## 2024-05-08 PROCEDURE — 99213 OFFICE O/P EST LOW 20 MIN: CPT

## 2024-05-08 RX ORDER — ROSUVASTATIN CALCIUM 5 MG/1
5 TABLET, FILM COATED ORAL
Qty: 90 | Refills: 0 | Status: ACTIVE | COMMUNITY
Start: 2019-11-27 | End: 1900-01-01

## 2024-05-08 RX ORDER — DUTASTERIDE 0.5 MG/1
0.5 CAPSULE, LIQUID FILLED ORAL
Qty: 90 | Refills: 3 | Status: ACTIVE | COMMUNITY
Start: 2023-06-01 | End: 1900-01-01

## 2024-05-08 RX ORDER — LANCETS 28 GAUGE
EACH MISCELLANEOUS
Refills: 0 | Status: DISCONTINUED | COMMUNITY
Start: 2020-01-13 | End: 2024-05-08

## 2024-05-08 RX ORDER — TAMSULOSIN HYDROCHLORIDE 0.4 MG/1
0.4 CAPSULE ORAL
Qty: 90 | Refills: 3 | Status: ACTIVE | COMMUNITY
Start: 2022-04-22 | End: 1900-01-01

## 2024-05-08 RX ORDER — BLOOD SUGAR DIAGNOSTIC
STRIP MISCELLANEOUS
Qty: 100 | Refills: 0 | Status: DISCONTINUED | COMMUNITY
Start: 2020-01-13 | End: 2024-05-08

## 2024-05-08 RX ORDER — FINASTERIDE 5 MG/1
5 TABLET, FILM COATED ORAL DAILY
Qty: 90 | Refills: 3 | Status: DISCONTINUED | COMMUNITY
Start: 2019-09-12 | End: 2024-05-08

## 2024-05-08 NOTE — HISTORY OF PRESENT ILLNESS
[FreeTextEntry1] : 84yo gentleman with cc of urinary retention. Pt here today with his wife. He was at ED 4/21/22 for inability to void. Had pham placed with report of 500cc drained (pt reports 1L). UCx negative. He was able to pass this and his PVR was 19.    Voids q 3h day time and nocturia X 1-3. At baseline, notes hesitancy in the am. SOme straining in am as well. otherwise minimal complaints.  He has been on dutasteride and flomax for the last year. Was changed from finasteride to dutasteride. Notes that when he is standing, he has little urge to void. Most of his bother is going from sitting to standing where he gets urge.   No fam hx of prostate ca. Dad with "prostate issues". Reports he has had variable PSAs in the past and had biopsy x3 that were all negative.   Has not seen PCP since Dec 2022. DUs to see new PCP in July.

## 2024-05-08 NOTE — ASSESSMENT
[FreeTextEntry1] : Hx of AUR. DOing well.  --Cont dutasteride and flomax --Encourage fluid intake but refrain from drinking fluids 3 hours before  bedtime.  --Address constipation  with fiber intake - fruits and vegetable servings 3-5 per day, use of daily Gummy fibers supplements if needed! --RTC in 1y with Dr Dowling

## 2024-07-16 ENCOUNTER — APPOINTMENT (OUTPATIENT)
Dept: INTERNAL MEDICINE | Facility: CLINIC | Age: 84
End: 2024-07-16

## 2024-10-27 ENCOUNTER — INPATIENT (INPATIENT)
Facility: HOSPITAL | Age: 84
LOS: 1 days | Discharge: HOME | DRG: 726 | End: 2024-10-29
Attending: HOSPITALIST | Admitting: STUDENT IN AN ORGANIZED HEALTH CARE EDUCATION/TRAINING PROGRAM
Payer: MEDICARE

## 2024-10-27 VITALS
WEIGHT: 175.05 LBS | DIASTOLIC BLOOD PRESSURE: 92 MMHG | RESPIRATION RATE: 20 BRPM | SYSTOLIC BLOOD PRESSURE: 173 MMHG | OXYGEN SATURATION: 99 % | HEART RATE: 59 BPM | HEIGHT: 72 IN

## 2024-10-27 DIAGNOSIS — Z98.89 OTHER SPECIFIED POSTPROCEDURAL STATES: Chronic | ICD-10-CM

## 2024-10-27 LAB
ADD ON TEST-SPECIMEN IN LAB: SIGNIFICANT CHANGE UP
ALBUMIN SERPL ELPH-MCNC: 4.5 G/DL — SIGNIFICANT CHANGE UP (ref 3.3–5)
ALP SERPL-CCNC: 73 U/L — SIGNIFICANT CHANGE UP (ref 40–120)
ALT FLD-CCNC: 11 U/L — SIGNIFICANT CHANGE UP (ref 10–45)
ANION GAP SERPL CALC-SCNC: 20 MMOL/L — HIGH (ref 5–17)
APPEARANCE UR: CLEAR — SIGNIFICANT CHANGE UP
AST SERPL-CCNC: 21 U/L — SIGNIFICANT CHANGE UP (ref 10–40)
BACTERIA # UR AUTO: NEGATIVE /HPF — SIGNIFICANT CHANGE UP
BASOPHILS # BLD AUTO: 0.02 K/UL — SIGNIFICANT CHANGE UP (ref 0–0.2)
BASOPHILS NFR BLD AUTO: 0.3 % — SIGNIFICANT CHANGE UP (ref 0–2)
BILIRUB SERPL-MCNC: 0.8 MG/DL — SIGNIFICANT CHANGE UP (ref 0.2–1.2)
BILIRUB UR-MCNC: NEGATIVE — SIGNIFICANT CHANGE UP
BUN SERPL-MCNC: 21 MG/DL — SIGNIFICANT CHANGE UP (ref 7–23)
CALCIUM SERPL-MCNC: 10.4 MG/DL — SIGNIFICANT CHANGE UP (ref 8.4–10.5)
CAST: 0 /LPF — SIGNIFICANT CHANGE UP (ref 0–4)
CHLORIDE SERPL-SCNC: 101 MMOL/L — SIGNIFICANT CHANGE UP (ref 96–108)
CO2 SERPL-SCNC: 18 MMOL/L — LOW (ref 22–31)
COLOR SPEC: YELLOW — SIGNIFICANT CHANGE UP
CREAT SERPL-MCNC: 1.14 MG/DL — SIGNIFICANT CHANGE UP (ref 0.5–1.3)
DIFF PNL FLD: ABNORMAL
EGFR: 63 ML/MIN/1.73M2 — SIGNIFICANT CHANGE UP
EOSINOPHIL # BLD AUTO: 0.01 K/UL — SIGNIFICANT CHANGE UP (ref 0–0.5)
EOSINOPHIL NFR BLD AUTO: 0.1 % — SIGNIFICANT CHANGE UP (ref 0–6)
GAS PNL BLDV: SIGNIFICANT CHANGE UP
GAS PNL BLDV: SIGNIFICANT CHANGE UP
GLUCOSE SERPL-MCNC: 170 MG/DL — HIGH (ref 70–99)
GLUCOSE UR QL: NEGATIVE MG/DL — SIGNIFICANT CHANGE UP
HCT VFR BLD CALC: 43.8 % — SIGNIFICANT CHANGE UP (ref 39–50)
HGB BLD-MCNC: 14.8 G/DL — SIGNIFICANT CHANGE UP (ref 13–17)
IMM GRANULOCYTES NFR BLD AUTO: 0.5 % — SIGNIFICANT CHANGE UP (ref 0–0.9)
KETONES UR-MCNC: NEGATIVE MG/DL — SIGNIFICANT CHANGE UP
LEUKOCYTE ESTERASE UR-ACNC: NEGATIVE — SIGNIFICANT CHANGE UP
LYMPHOCYTES # BLD AUTO: 0.46 K/UL — LOW (ref 1–3.3)
LYMPHOCYTES # BLD AUTO: 6.1 % — LOW (ref 13–44)
MCHC RBC-ENTMCNC: 31.1 PG — SIGNIFICANT CHANGE UP (ref 27–34)
MCHC RBC-ENTMCNC: 33.8 GM/DL — SIGNIFICANT CHANGE UP (ref 32–36)
MCV RBC AUTO: 92 FL — SIGNIFICANT CHANGE UP (ref 80–100)
MONOCYTES # BLD AUTO: 0.39 K/UL — SIGNIFICANT CHANGE UP (ref 0–0.9)
MONOCYTES NFR BLD AUTO: 5.2 % — SIGNIFICANT CHANGE UP (ref 2–14)
NEUTROPHILS # BLD AUTO: 6.63 K/UL — SIGNIFICANT CHANGE UP (ref 1.8–7.4)
NEUTROPHILS NFR BLD AUTO: 87.8 % — HIGH (ref 43–77)
NITRITE UR-MCNC: NEGATIVE — SIGNIFICANT CHANGE UP
NRBC # BLD: 0 /100 WBCS — SIGNIFICANT CHANGE UP (ref 0–0)
PH UR: 7.5 — SIGNIFICANT CHANGE UP (ref 5–8)
PLATELET # BLD AUTO: 204 K/UL — SIGNIFICANT CHANGE UP (ref 150–400)
POTASSIUM SERPL-MCNC: 4.1 MMOL/L — SIGNIFICANT CHANGE UP (ref 3.5–5.3)
POTASSIUM SERPL-SCNC: 4.1 MMOL/L — SIGNIFICANT CHANGE UP (ref 3.5–5.3)
PROT SERPL-MCNC: 7.3 G/DL — SIGNIFICANT CHANGE UP (ref 6–8.3)
PROT UR-MCNC: NEGATIVE MG/DL — SIGNIFICANT CHANGE UP
RBC # BLD: 4.76 M/UL — SIGNIFICANT CHANGE UP (ref 4.2–5.8)
RBC # FLD: 12.2 % — SIGNIFICANT CHANGE UP (ref 10.3–14.5)
RBC CASTS # UR COMP ASSIST: 2 /HPF — SIGNIFICANT CHANGE UP (ref 0–4)
SODIUM SERPL-SCNC: 139 MMOL/L — SIGNIFICANT CHANGE UP (ref 135–145)
SP GR SPEC: 1.01 — SIGNIFICANT CHANGE UP (ref 1–1.03)
SQUAMOUS # UR AUTO: 0 /HPF — SIGNIFICANT CHANGE UP (ref 0–5)
UROBILINOGEN FLD QL: 0.2 MG/DL — SIGNIFICANT CHANGE UP (ref 0.2–1)
WBC # BLD: 7.55 K/UL — SIGNIFICANT CHANGE UP (ref 3.8–10.5)
WBC # FLD AUTO: 7.55 K/UL — SIGNIFICANT CHANGE UP (ref 3.8–10.5)
WBC UR QL: 0 /HPF — SIGNIFICANT CHANGE UP (ref 0–5)

## 2024-10-27 PROCEDURE — 99223 1ST HOSP IP/OBS HIGH 75: CPT | Mod: FS

## 2024-10-27 PROCEDURE — 74177 CT ABD & PELVIS W/CONTRAST: CPT | Mod: 26,MC

## 2024-10-27 PROCEDURE — 76770 US EXAM ABDO BACK WALL COMP: CPT | Mod: 26

## 2024-10-27 RX ORDER — ROSUVASTATIN CALCIUM 10 MG
5 TABLET ORAL AT BEDTIME
Refills: 0 | Status: DISCONTINUED | OUTPATIENT
Start: 2024-10-27 | End: 2024-10-29

## 2024-10-27 RX ORDER — AMPICILLIN AND SULBACTAM 2; 1 G/1; G/1
3 INJECTION, POWDER, FOR SOLUTION INTRAMUSCULAR; INTRAVENOUS EVERY 6 HOURS
Refills: 0 | Status: DISCONTINUED | OUTPATIENT
Start: 2024-10-27 | End: 2024-10-29

## 2024-10-27 RX ORDER — AMPICILLIN AND SULBACTAM 2; 1 G/1; G/1
3 INJECTION, POWDER, FOR SOLUTION INTRAMUSCULAR; INTRAVENOUS ONCE
Refills: 0 | Status: COMPLETED | OUTPATIENT
Start: 2024-10-27 | End: 2024-10-27

## 2024-10-27 RX ORDER — AMLODIPINE BESYLATE 10 MG
10 TABLET ORAL DAILY
Refills: 0 | Status: DISCONTINUED | OUTPATIENT
Start: 2024-10-27 | End: 2024-10-28

## 2024-10-27 RX ORDER — ONDANSETRON HYDROCHLORIDE 2 MG/ML
4 INJECTION, SOLUTION INTRAMUSCULAR; INTRAVENOUS ONCE
Refills: 0 | Status: COMPLETED | OUTPATIENT
Start: 2024-10-27 | End: 2024-10-27

## 2024-10-27 RX ORDER — SODIUM CHLORIDE 9 MG/ML
1000 INJECTION, SOLUTION INTRAMUSCULAR; INTRAVENOUS; SUBCUTANEOUS
Refills: 0 | Status: DISCONTINUED | OUTPATIENT
Start: 2024-10-27 | End: 2024-10-28

## 2024-10-27 RX ORDER — FINASTERIDE 5 MG/1
5 TABLET, FILM COATED ORAL DAILY
Refills: 0 | Status: DISCONTINUED | OUTPATIENT
Start: 2024-10-27 | End: 2024-10-29

## 2024-10-27 RX ORDER — TAMSULOSIN HCL 0.4 MG
0.4 CAPSULE ORAL AT BEDTIME
Refills: 0 | Status: DISCONTINUED | OUTPATIENT
Start: 2024-10-27 | End: 2024-10-29

## 2024-10-27 RX ORDER — ACETAMINOPHEN 500 MG
1000 TABLET ORAL ONCE
Refills: 0 | Status: COMPLETED | OUTPATIENT
Start: 2024-10-27 | End: 2024-10-27

## 2024-10-27 RX ORDER — SODIUM CHLORIDE 9 MG/ML
1000 INJECTION, SOLUTION INTRAMUSCULAR; INTRAVENOUS; SUBCUTANEOUS ONCE
Refills: 0 | Status: COMPLETED | OUTPATIENT
Start: 2024-10-27 | End: 2024-10-27

## 2024-10-27 RX ADMIN — FINASTERIDE 5 MILLIGRAM(S): 5 TABLET, FILM COATED ORAL at 13:27

## 2024-10-27 RX ADMIN — Medication 400 MILLIGRAM(S): at 07:07

## 2024-10-27 RX ADMIN — Medication 10 MILLIGRAM(S): at 17:26

## 2024-10-27 RX ADMIN — ONDANSETRON HYDROCHLORIDE 4 MILLIGRAM(S): 2 INJECTION, SOLUTION INTRAMUSCULAR; INTRAVENOUS at 13:25

## 2024-10-27 RX ADMIN — Medication 0.4 MILLIGRAM(S): at 23:18

## 2024-10-27 RX ADMIN — AMPICILLIN AND SULBACTAM 200 GRAM(S): 2; 1 INJECTION, POWDER, FOR SOLUTION INTRAMUSCULAR; INTRAVENOUS at 16:13

## 2024-10-27 RX ADMIN — ONDANSETRON HYDROCHLORIDE 4 MILLIGRAM(S): 2 INJECTION, SOLUTION INTRAMUSCULAR; INTRAVENOUS at 06:35

## 2024-10-27 RX ADMIN — SODIUM CHLORIDE 125 MILLILITER(S): 9 INJECTION, SOLUTION INTRAMUSCULAR; INTRAVENOUS; SUBCUTANEOUS at 12:33

## 2024-10-27 RX ADMIN — SODIUM CHLORIDE 1000 MILLILITER(S): 9 INJECTION, SOLUTION INTRAMUSCULAR; INTRAVENOUS; SUBCUTANEOUS at 07:19

## 2024-10-27 RX ADMIN — Medication 5 MILLIGRAM(S): at 21:42

## 2024-10-27 RX ADMIN — AMPICILLIN AND SULBACTAM 200 GRAM(S): 2; 1 INJECTION, POWDER, FOR SOLUTION INTRAMUSCULAR; INTRAVENOUS at 11:04

## 2024-10-27 RX ADMIN — SODIUM CHLORIDE 125 MILLILITER(S): 9 INJECTION, SOLUTION INTRAMUSCULAR; INTRAVENOUS; SUBCUTANEOUS at 21:41

## 2024-10-27 RX ADMIN — ONDANSETRON HYDROCHLORIDE 4 MILLIGRAM(S): 2 INJECTION, SOLUTION INTRAMUSCULAR; INTRAVENOUS at 19:40

## 2024-10-27 RX ADMIN — ONDANSETRON HYDROCHLORIDE 4 MILLIGRAM(S): 2 INJECTION, SOLUTION INTRAMUSCULAR; INTRAVENOUS at 11:04

## 2024-10-27 NOTE — ED CDU PROVIDER INITIAL DAY NOTE - PROGRESS NOTE DETAILS
BP noted, states bp at last physical in july 159/80. never been on antihypertensives, pain resolved at this time. will give oral meds and reassess as patient is asymptomatic. - Mercy Muro PA-C attempted one bite pudding and one bite of soup, with emesis, will remedicate. - Mercy Muro PA-C Patient evaluated at bedside. NAD. VSS. BP improved to 176/79 after meds. No active vomiting. States that he has no pain at this time and nausea has resolved after zofran. WIll continue with serial abdominal exams and reassessment. Paola Alexandre PA-C

## 2024-10-27 NOTE — ED PROVIDER NOTE - ATTENDING CONTRIBUTION TO CARE
Attending MD Daija Cardenas:  I personally have seen and examined this patient.  Resident note reviewed and agree on plan of care and except where noted.  See HPI, PE, and MDM for details.

## 2024-10-27 NOTE — ED ADULT NURSE NOTE - OBJECTIVE STATEMENT
84yoM PMH BPH, Hernia Surgery (2014), HLD BIBEMS for lower abdominal pain. Patient states pain is 10/10 in lower abdomen beginning at 12pm last night, worse on palpation. Patient states abdomen is distended much more than usual. Patient last urinary output 8pm yesterday but only able to pass a small dribble. Patient ate dinner at social event, unsure if food was safe. Patient reports one episode of small amount of diarrhea yesterday morning, denies antibiotic use and recent travel. Patient was admitted 3 months ago for urinary retention, required pham to be placed for 3 days, was able to discharge to home, began flomax and has not had any similar issues. Patient states pain today is worse than episode 3 months ago. Bladder scan performed indicating >999mL retained urine. Patient vomitted once during initial evaluation due to pain. Denies chest pain, SOB, falls, injuries, fevers, chills, painful urination, confusion, headaches.

## 2024-10-27 NOTE — ED CDU PROVIDER DISPOSITION NOTE - CLINICAL COURSE
85 y/o male hx BPH, HLD and predm presenting to the ED for evaluation of of lower abdominal painl, vomiting and loose stools beginning today. no fever. prior abd surgeries significant for inguinal hernia. patient found to be in retention, pham placed with UA not showing evidence of uti. also noting underdistension vs mild diverticulitis. patient waas placed in cdu for iv hydration and abx. BP noted to be elevated. no evidence of end organ damage and patient without any symptoms, will treat orally and reasses.   in cdu, 83 y/o male hx BPH, HLD and predm presenting to the ED for evaluation of of lower abdominal painl, vomiting and loose stools beginning today. no fever. prior abd surgeries significant for inguinal hernia. patient found to be in retention, pham placed with UA not showing evidence of uti. also noting underdistension vs mild diverticulitis. patient waas placed in cdu for iv hydration and abx. BP noted to be elevated. no evidence of end organ damage and patient without any symptoms, will treat orally and reasses.   in cdu, patient found to have + delta trops. as well as multiple episodes of vomiting. will admit patient to medicine for further eval and management. pending cardiology eval at this time. discussed with Dr. Patten

## 2024-10-27 NOTE — ED ADULT NURSE NOTE - NSFALLUNIVINTERV_ED_ALL_ED
Bed/Stretcher in lowest position, wheels locked, appropriate side rails in place/Call bell, personal items and telephone in reach/Instruct patient to call for assistance before getting out of bed/chair/stretcher/Non-slip footwear applied when patient is off stretcher/[place] to call system/Physically safe environment - no spills, clutter or unnecessary equipment/Purposeful proactive rounding/Room/bathroom lighting operational, light cord in reach

## 2024-10-27 NOTE — ED CDU PROVIDER INITIAL DAY NOTE - CLINICAL SUMMARY MEDICAL DECISION MAKING FREE TEXT BOX
Attending Daija Cardenas: 83 yo male presenting with concern for urinary retention, abdominal pain and n/v. ct scan of abdomen showed evidence of mild diverticulitits. pham placed for urinary retention with improvement in pain. pt placed in cdu for abx, hydration and monitoring. pt denies any chest pain or sob

## 2024-10-27 NOTE — ED CDU PROVIDER INITIAL DAY NOTE - ATTENDING APP SHARED VISIT CONTRIBUTION OF CARE
Attending MD Cardenas:  I have personally performed a face to face diagnostic evaluation on this patient.  I have reviewed the ACP note and agree with the history, exam, and plan of care, except as noted.

## 2024-10-27 NOTE — ED PROVIDER NOTE - CARE PLAN
Principal Discharge DX:	Urine retention   1 Principal Discharge DX:	Urine retention  Secondary Diagnosis:	Diverticulitis   Principal Discharge DX:	Urine retention  Secondary Diagnosis:	Diverticulitis  Secondary Diagnosis:	Aortic root aneurysm

## 2024-10-27 NOTE — ED ADULT TRIAGE NOTE - BANDS:
Same Day Surgery Discharge Instructions for  Sedation and General Anesthesia     It's not unusual to feel dizzy, light-headed or faint for up to 24 hours after surgery or while taking pain medication.  If you have these symptoms: sit for a few minutes before standing and have someone assist you when you get up to walk or use the bathroom.    You should rest and relax for the next 24 hours. We recommend you make arrangements to have an adult stay with you for at least 24 hours after your discharge.  Avoid hazardous and strenuous activity.    DO NOT DRIVE any vehicle or operate mechanical equipment for 24 hours following the end of your surgery.  Even though you may feel normal, your reactions may be affected by the medication you have received.    Do not drink alcoholic beverages for 24 hours following surgery.     Slowly progress to your regular diet as you feel able. It's not unusual to feel nauseated and/or vomit after receiving anesthesia.  If you develop these symptoms, drink clear liquids (apple juice, ginger ale, broth, 7-up, etc. ) until you feel better.  If your nausea and vomiting persists for 24 hours, please notify your surgeon.      All narcotic pain medications, along with inactivity and anesthesia, can cause constipation. Drinking plenty of liquids and increasing fiber intake will help.    For any questions of a medical nature, call your surgeon.    Do not make important decisions for 24 hours.    If you had general anesthesia, you may have a sore throat for a couple of days related to the breathing tube used during surgery.  You may use Cepacol lozenges to help with this discomfort.  If it worsens or if you develop a fever, contact your surgeon.     If you feel your pain is not well managed with the pain medications prescribed by your surgeon, please contact your surgeon's office to let them know so they can address your concerns.     Arteriovenous Fistula/Graft Creation    Post-operative  "instructions    An AVF (arteriovenous fistula) is created by directly connecting a person's artery and vein - usually in the arm.  This procedure may be performed as an outpatient operation using a local anesthetic.  As blood flows to the vein from the newly connected artery, the vein grows bigger and stronger.  You will need to do exercises-such as squeezing a rubber ball-to help the fistula strengthen and mature to get it ready for use.  It can take a minimum of 6 weeks to months before the fistula matures and is ready to be used for hemodialysis.    Sometimes, a person's vein is not large enough to create a fistula.  In this case, a graft will be used to connect the artery and vein.  Your surgeon will let you know what type of graft they will be using if this is necessary.    It is extremely important that you check for a \"thrill\" or a pulse in your fistula EVERYDAY.  If you note any changes, you must notify your dialysis unit or call us immediately.      Arteriovenous Fistula:    Arteriovenous Graft:    Source: [employer]    The fistula is considered the \"gold standard\" access because it:  Has a lower risk of infections than other access types  Has a lower risk of forming clots than other access types  Performs better than other accesses  Allows for greater blood flow  Lasts longer than other access types  Can last many year, even decades, when well-care for    Some issues people may have with fistulas include:  The appearance of bulging veins at the access site  Taking several months for a new one to mature  Not maturing at all in some case    How to increase the size of your AV fistula:  After surgery, the vein needs time to enlarge, or mature, so that needles can be easily inserted for dialysis.  You can help this by performing exercises as listed below.  First, exercise your arm!  Use your arm for usual activities  Second, you will be given a tourniquet and exercise ball/foam to help with vein " "exercises.  Beginning one week after your surgery, apply a tourniquet in the armpit region and tighten this.  You will want it tight enough to make the veins stand out, but not so tight that your hand falls asleep.  Leave the tourniquet in place for 10-15 minutes.  Exercise your forearm during this period by squeezing a ball.   Repeat this activity 3-5 times a day.  It may take 6-8 weeks of this type of activity for your fistula to \"mature\" enough to use.    Post-operative appointments:  You will have a 2 week follow up appointment with the surgeon or nurse practitioner.  Approximately 6 weeks after surgery, you will need an ultrasound to evaluate the fistula for maturation.  Depending on the type of fistula creation you had, there may be additional ultrasounds.    When to call our office:  Signs of infection--redness, swelling, drainage, temperature.  Fever over 101 .  Persistent nausea and vomiting.  Unable to feel the \"thrill\" or pulse in your fistula/graft.    Call our main number, [phone], for assistance with any concerns or questions.      **If you have questions or concerns about your procedure  call Dr Ramiro Pantoja at [phone]**   " Fall Risk;

## 2024-10-27 NOTE — ED PROVIDER NOTE - PROGRESS NOTE DETAILS
Travis BURGER: Patient in signout.  84 male with past medical history of hyperlipidemia, BPH presenting lower abdominal pain, urinary retention.  Patient is supposed Conway catheter placement with 400 cc urine output.  Currently on examination is vitally stable, abdominal examination is benign.  Patient also reports having an accidental/mechanical fall 4 days ago with bruise to left side of jaw.  Assessment is neuro intact, no intraoral bleeding/lesion, tooth deformity. Is pending labs + CT A/P. Will continue to monitor. Attending Daija Cardenas: CT shows evidence bladder wall thickening as well as possible mild diverticulitis.  Patient still endorsing some nausea EKG performed with improvement in QTc.  On repeat exam abdomen is soft.with age and vomiting will cover iwth abx Travis BURGER: repeat EKG QTc 463. Patient vomited. Will dose Zofran. Shall treat for diverticulitis. Travis BURGER: Patient failed p.o. trial will consider hospital admission. Travis BURGER: Patient failed p.o. trial will consider CDU stay. Attending Daija Cardenas: pt feeling nauseus with food otherwise comofortable. difficulty tolerating po, will place in cdu for monitoring. ct shows evidence of aortic root dilation. pt denies any chest pain or back pain. pain free currently

## 2024-10-27 NOTE — ED PROVIDER NOTE - OBJECTIVE STATEMENT
83 y/o male  pt pmhx enlarged prostate presents to ED for sudden onset lower abdominal pain and distension. Pt states he has had to get cathed for urinary retention in the past. Denies fevers. Pt is actively vomiting in room, and reports multiple episodes of diarrhea this morning. hx of b/l inguinal hernia repair. No recent abx use.

## 2024-10-27 NOTE — ED ADULT TRIAGE NOTE - HEIGHT IN CM
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 Per ID clinic note, Dr. Yuan does not feel that the patient's current dyspnea is 2/2 an infectious process. Recommends stopping abx therapy while evaluating for another cause.   - Continue home inhalers  - Chest physiotherapy     182.88

## 2024-10-27 NOTE — ED CDU PROVIDER DISPOSITION NOTE - NSFOLLOWUPINSTRUCTIONS_ED_ALL_ED_FT
stay hydrated  Keep pham in place and follow up with urology in 1 week  Take Augmentin 2x per day for 10 days  Follow up with gastroenterology   Be aware you were noted to have an incidental finding that requires outpatient follow up    "A 1.9 cm cystic lesion at the pancreatic head. Recommend MR abdomen for   further evaluation on a nonemergent basis"           Diverticulitis    Diverticulitis is inflammation or infection of small pouches in your colon that form when you HAVE a condition called diverticulosis. This condition can range from mild to severe potentially leading to perforation or obstructions of your colon. Symptoms include abdominal pain, fever/chills, nausea, vomiting, diarrhea, constipation, or blood in your stool. If you were prescribed an antibiotic medicine, take it as told by your health care provider. Do not stop taking the antibiotic even if you start to feel better.    SEEK IMMEDIATE MEDICAL CARE IF YOU HAVE ANY OF THE FOLLOWING SYMPTOMS: worsening abdominal pain, high fever, inability to hold down liquids or medication, black or bloody stools, inability to pass gas, lightheadedness/dizziness, or a change in mental status.

## 2024-10-27 NOTE — ED PROVIDER NOTE - PHYSICAL EXAMINATION
GENERAL: NAD  HEENT:  Atraumatic  CHEST/LUNG: Chest rise equal bilaterally  HEART: Regular rate and rhythm  ABDOMEN: Soft, distended, diffuse lower abdominal pain  EXTREMITIES:  Extremities warm  PSYCH: A&Ox3  SKIN: No obvious rashes or lesions GENERAL: NAD  HEENT:  Atraumatic  CHEST/LUNG: Chest rise equal bilaterally  HEART: Regular rate and rhythm  ABDOMEN: Soft, distended, diffuse lower abdominal pain  EXTREMITIES:  Extremities warm  PSYCH: A&Ox3  SKIN: No obvious rashes or lesions  Attending Daija Cardenas: Gen: NAD, heent: ecchymomse to left chin eomi, perrla, mmm, op pink, uvula midline, neck; nttp, no nuchal rigidity, chest: nttp, no crepitus, cv: rrr,  lungs: ctab, abd: sdistended ttp suprapbic area with fullness+ no guarding, ext: wwp, neg homans, skin: no rash, neuro: awake and alert, following commands, speech clear, sensation and strength intact, no focal deficits

## 2024-10-27 NOTE — ED CDU PROVIDER INITIAL DAY NOTE - OBJECTIVE STATEMENT
85 y/o male  pt pmhx enlarged prostate presents to ED for sudden onset lower abdominal pain and distension. Pt states he has had to get cathed for urinary retention in the past most recently about 1 year ago. Denies fevers. Pt is actively vomiting in room, and reports multiple episodes of diarrhea this morning. hx of b/l inguinal ,hernia repair. No recent abx use.

## 2024-10-27 NOTE — ED PROVIDER NOTE - CLINICAL SUMMARY MEDICAL DECISION MAKING FREE TEXT BOX
>1L of residual urine on bladder scan. place pham cath and get UA/UC to eval for UTI. Urinary retention could be related to enlarged prostate/prostatitis. Low concern for SBO with pt's bouts of diarrhea this morning. fluids and zofran in ED. dispo pending Ct abdomen/pelvis >1L of residual urine on bladder scan. place pham cath and get UA/UC to eval for UTI. Urinary retention could be related to enlarged prostate/prostatitis. Low concern for SBO with pt's bouts of diarrhea this morning. fluids and zofran in ED. dispo pending Ct abdomen/pelvis  Attending Daija Cardenas: 84-year-old male with history of enlarged prostate with prior Pham a couple of years ago who follows with urologist presenting with concern for difficulty urinating associated with nausea and vomiting as well as diarrhea.  Upon arrival patient uncomfortable appearing complaining of lower abdominal pain.  Patient also reports having some vomiting and some diarrhea.  Did have a fall 4 days ago where he tripped and fell and did hit the top of his head.  Patient is denying any numbness or tingling or headache since and has been able to ambulate.  Upon arrival patient was initially uncomfortable appearing with pain.  Abdomen was distended and tender to palpation in the suprapubic area.  Point-of-care ultrasound performed upon arrival showing evidence of significant urinary retention.  No blood in the vomit or stool.  Nonfocal neurologic exam.  Pham placed with significant improvement in pain.  Suspect patient might have an enlarged prostate which could be causing his urinary retention.  Will check labs, electrolytes, consider CT scan of the abdomen pelvis to further evaluate for any evidence of colitis, diverticulitis.  Will reeval pending workup.  No chest wall tenderness to palpation and no shortness of breath making rib injury from fall a few days ago less likely. less likely acute obstruction as pt is havingbowel movements

## 2024-10-27 NOTE — ED PROVIDER NOTE - NSFOLLOWUPINSTRUCTIONS_ED_ALL_ED_FT
Acute urinary retention is when a person cannot pee (urinate) at all, or can only pee a little. This can come on all of a sudden. If it is not treated, it can lead to kidney problems or other serious problems.    What are the causes?  A problem with the tube that drains the bladder (urethra).  Problems with the nerves in the bladder.  Tumors.  Certain medicines.  An infection.  Having trouble pooping (constipation).  What increases the risk?  Older men are more at risk because their prostate gland may become larger as they age. Other conditions also can increase risk. These include:  Diseases, such as multiple sclerosis.  Injury to the spinal cord.  Diabetes.  A condition that affects the way the brain works, such as dementia.  Holding back urine due to trauma or because you do not want to use the bathroom.  What are the signs or symptoms?  Trouble peeing.  Pain in the lower belly.  How is this treated?  Treatment for this condition may include:  Medicines.  Placing a thin, germ-free tube (catheter) into the bladder to drain pee out of the body.  Therapy to treat mental health conditions.  Treatment for conditions that may cause this.  If needed, you may be treated in the hospital for kidney problems or to manage other problems.    Follow these instructions at home:  Medicines    Take over-the-counter and prescription medicines only as told by your doctor. Ask your doctor what medicines you should stay away from.  If you were given an antibiotic medicine, take it as told by your doctor. Do not stop taking it, even if you start to feel better.  General instructions    Do not smoke or use any products that contain nicotine or tobacco. If you need help quitting, ask your doctor.  Drink enough fluid to keep your pee pale yellow.  If you were sent home with a tube that drains the bladder, take care of it as told by your doctor.  Watch for changes in your symptoms. Tell your doctor about them.  If told, keep track of changes in your blood pressure at home. Tell your doctor about them.  Keep all follow-up visits.  Contact a doctor if:  You have spasms in your bladder that you cannot stop.  You leak pee when you have spasms.  Get help right away if:  You have chills or a fever.  You have blood in your pee.  You have a tube that drains pee from the bladder and these things happen:  The tube stops draining pee.  The tube falls out.  Summary  Acute urinary retention is when you cannot pee at all or you pee too little.  If this condition is not treated, it can lead to kidney problems or other serious problems.  If you were sent home with a tube (catheter) that drains the bladder, take care of it as told by your doctor.  Watch for changes in your symptoms. Tell your doctor about them.  This information is not intended to replace advice given to you by your health care provider. Make sure you discuss any questions you have with your health care provider.

## 2024-10-27 NOTE — ED CDU PROVIDER INITIAL DAY NOTE - NSICDXPASTMEDICALHX_GEN_ALL_CORE_FT
PAST MEDICAL HISTORY:  BPH (benign prostatic hyperplasia)     HLD (hyperlipidemia)     Prediabetes

## 2024-10-28 DIAGNOSIS — R33.9 RETENTION OF URINE, UNSPECIFIED: ICD-10-CM

## 2024-10-28 DIAGNOSIS — K52.9 NONINFECTIVE GASTROENTERITIS AND COLITIS, UNSPECIFIED: ICD-10-CM

## 2024-10-28 DIAGNOSIS — I77.810 THORACIC AORTIC ECTASIA: ICD-10-CM

## 2024-10-28 DIAGNOSIS — R73.03 PREDIABETES: ICD-10-CM

## 2024-10-28 DIAGNOSIS — Z29.9 ENCOUNTER FOR PROPHYLACTIC MEASURES, UNSPECIFIED: ICD-10-CM

## 2024-10-28 DIAGNOSIS — R79.89 OTHER SPECIFIED ABNORMAL FINDINGS OF BLOOD CHEMISTRY: ICD-10-CM

## 2024-10-28 LAB
CULTURE RESULTS: NO GROWTH — SIGNIFICANT CHANGE UP
HCT VFR BLD CALC: 42.7 % — SIGNIFICANT CHANGE UP (ref 39–50)
HGB BLD-MCNC: 14.3 G/DL — SIGNIFICANT CHANGE UP (ref 13–17)
MAGNESIUM SERPL-MCNC: 2.1 MG/DL — SIGNIFICANT CHANGE UP (ref 1.6–2.6)
MCHC RBC-ENTMCNC: 31.3 PG — SIGNIFICANT CHANGE UP (ref 27–34)
MCHC RBC-ENTMCNC: 33.5 GM/DL — SIGNIFICANT CHANGE UP (ref 32–36)
MCV RBC AUTO: 93.4 FL — SIGNIFICANT CHANGE UP (ref 80–100)
NRBC # BLD: 0 /100 WBCS — SIGNIFICANT CHANGE UP (ref 0–0)
PHOSPHATE SERPL-MCNC: 2.2 MG/DL — LOW (ref 2.5–4.5)
PLATELET # BLD AUTO: 180 K/UL — SIGNIFICANT CHANGE UP (ref 150–400)
RBC # BLD: 4.57 M/UL — SIGNIFICANT CHANGE UP (ref 4.2–5.8)
RBC # FLD: 12.4 % — SIGNIFICANT CHANGE UP (ref 10.3–14.5)
SPECIMEN SOURCE: SIGNIFICANT CHANGE UP
TROPONIN T, HIGH SENSITIVITY RESULT: 39 NG/L — SIGNIFICANT CHANGE UP (ref 0–51)
WBC # BLD: 8.29 K/UL — SIGNIFICANT CHANGE UP (ref 3.8–10.5)
WBC # FLD AUTO: 8.29 K/UL — SIGNIFICANT CHANGE UP (ref 3.8–10.5)

## 2024-10-28 PROCEDURE — 99223 1ST HOSP IP/OBS HIGH 75: CPT

## 2024-10-28 PROCEDURE — 71275 CT ANGIOGRAPHY CHEST: CPT | Mod: 26,MC

## 2024-10-28 PROCEDURE — 74174 CTA ABD&PLVS W/CONTRAST: CPT | Mod: 26,MC

## 2024-10-28 PROCEDURE — 99232 SBSQ HOSP IP/OBS MODERATE 35: CPT | Mod: 25

## 2024-10-28 PROCEDURE — 99233 SBSQ HOSP IP/OBS HIGH 50: CPT | Mod: FS,25

## 2024-10-28 RX ORDER — ROSUVASTATIN CALCIUM 10 MG
1 TABLET ORAL
Refills: 0 | DISCHARGE

## 2024-10-28 RX ORDER — AMLODIPINE BESYLATE 10 MG
10 TABLET ORAL DAILY
Refills: 0 | Status: DISCONTINUED | OUTPATIENT
Start: 2024-10-28 | End: 2024-10-29

## 2024-10-28 RX ORDER — SODIUM CHLORIDE 9 MG/ML
1000 INJECTION, SOLUTION INTRAMUSCULAR; INTRAVENOUS; SUBCUTANEOUS
Refills: 0 | Status: DISCONTINUED | OUTPATIENT
Start: 2024-10-28 | End: 2024-10-29

## 2024-10-28 RX ORDER — DIBASIC SODIUM PHOSPHATE, MONOBASIC POTASSIUM PHOSPHATE AND MONOBASIC SODIUM PHOSPHATE 852; 155; 130 MG/1; MG/1; MG/1
1 TABLET ORAL ONCE
Refills: 0 | Status: COMPLETED | OUTPATIENT
Start: 2024-10-28 | End: 2024-10-28

## 2024-10-28 RX ORDER — INFLUENZ VIR VAC TV P-SURF2003 15MCG/.5ML
0.5 SYRINGE (ML) INTRAMUSCULAR ONCE
Refills: 0 | Status: DISCONTINUED | OUTPATIENT
Start: 2024-10-28 | End: 2024-10-29

## 2024-10-28 RX ORDER — ACETAMINOPHEN 500 MG
650 TABLET ORAL EVERY 6 HOURS
Refills: 0 | Status: DISCONTINUED | OUTPATIENT
Start: 2024-10-28 | End: 2024-10-29

## 2024-10-28 RX ORDER — DUTASTERIDE 0.5 MG/1
1 CAPSULE, LIQUID FILLED ORAL
Refills: 0 | DISCHARGE

## 2024-10-28 RX ORDER — TAMSULOSIN HCL 0.4 MG
1 CAPSULE ORAL
Refills: 0 | DISCHARGE

## 2024-10-28 RX ADMIN — Medication 10 MILLIGRAM(S): at 18:10

## 2024-10-28 RX ADMIN — FINASTERIDE 5 MILLIGRAM(S): 5 TABLET, FILM COATED ORAL at 13:06

## 2024-10-28 RX ADMIN — DIBASIC SODIUM PHOSPHATE, MONOBASIC POTASSIUM PHOSPHATE AND MONOBASIC SODIUM PHOSPHATE 1 PACKET(S): 852; 155; 130 TABLET ORAL at 17:55

## 2024-10-28 RX ADMIN — Medication 5 MILLIGRAM(S): at 22:15

## 2024-10-28 RX ADMIN — SODIUM CHLORIDE 75 MILLILITER(S): 9 INJECTION, SOLUTION INTRAMUSCULAR; INTRAVENOUS; SUBCUTANEOUS at 17:55

## 2024-10-28 RX ADMIN — AMPICILLIN AND SULBACTAM 200 GRAM(S): 2; 1 INJECTION, POWDER, FOR SOLUTION INTRAMUSCULAR; INTRAVENOUS at 05:39

## 2024-10-28 RX ADMIN — Medication 0.4 MILLIGRAM(S): at 22:15

## 2024-10-28 RX ADMIN — AMPICILLIN AND SULBACTAM 200 GRAM(S): 2; 1 INJECTION, POWDER, FOR SOLUTION INTRAMUSCULAR; INTRAVENOUS at 13:06

## 2024-10-28 RX ADMIN — AMPICILLIN AND SULBACTAM 200 GRAM(S): 2; 1 INJECTION, POWDER, FOR SOLUTION INTRAMUSCULAR; INTRAVENOUS at 00:20

## 2024-10-28 RX ADMIN — AMPICILLIN AND SULBACTAM 200 GRAM(S): 2; 1 INJECTION, POWDER, FOR SOLUTION INTRAMUSCULAR; INTRAVENOUS at 19:46

## 2024-10-28 RX ADMIN — SODIUM CHLORIDE 125 MILLILITER(S): 9 INJECTION, SOLUTION INTRAMUSCULAR; INTRAVENOUS; SUBCUTANEOUS at 13:34

## 2024-10-28 NOTE — H&P ADULT - PROBLEM SELECTOR PLAN 1
- Noted delta troponin  - EKG with prolonged QTc  - Cardiology team consulted, F/U on their eval/recs.     #  CTA with incidental finding dilated aortic root - discussed with patient and family at bedside.   - CT surgery team consulted, F/U on their eval/recs  > for now no surgical intervention recommended, F/U outpatient with surveillance images in 6 months. - Noted delta troponin  - EKG with prolonged QT  - Cardiology team consulted, F/U on their eval/recs -pending.  - Telemonitoring  - Trend troponin  - Monitor BMP, Mg    #  CTA with incidental finding dilated aortic root  > 4.7 cm ascending aorta - discussed with patient and family at bedside on admission.   - CT surgery team consulted, F/U on their eval/recs  > for now no surgical intervention recommended, F/U outpatient with surveillance images in 6 months.    # Hypertension uncontrolled: Amlodipine started in the ED. Continue with hold parameters. Monitor BP.

## 2024-10-28 NOTE — CONSULT NOTE ADULT - SUBJECTIVE AND OBJECTIVE BOX
History of Present Illness:    83 y/o male hx BPH, HLD and predm presenting to the ED for evaluation of of lower abdominal painl, vomiting and loose stools beginning today. no fever. prior abd surgeries significant for inguinal hernia. patient found to be in retention, pham placed with UA not showing evidence of uti. also noting underdistension vs mild diverticulitis. patient waas placed in cdu for iv hydration and abx. BP noted to be elevated. no evidence of end organ damage and patient without any symptoms, will treat orally and reasses.   in cdu, patient found to have +  trops. as well as multiple episodes of vomiting. + NSTEMI CTA w/ incidental finding dilated aortic root> CTS consulted    Past Medical History  BPH (benign prostatic hyperplasia)    Prediabetes    HLD (hyperlipidemia)        Past Surgical History  Status post inguinal hernia repair using synthetic patch        MEDICATIONS  (STANDING):  amLODIPine   Tablet 10 milliGRAM(s) Oral daily  ampicillin/sulbactam  IVPB 3 Gram(s) IV Intermittent every 6 hours  finasteride 5 milliGRAM(s) Oral daily  rosuvastatin 5 milliGRAM(s) Oral at bedtime  sodium chloride 0.9%. 1000 milliLiter(s) (125 mL/Hr) IV Continuous <Continuous>  tamsulosin 0.4 milliGRAM(s) Oral at bedtime    MEDICATIONS  (PRN):    Antiplatelet therapy:                           Last dose/amt:    Allergies: No Known Allergies          Relevant Family History  FAMILY HISTORY:  Family history of diabetes mellitus (Mother)        Review of Systems  GENERAL:  Fevers[] chills[] sweats[] fatigue[] weight loss[] weight gain []                                        NEURO:  parathesias[] seizures []  syncope []  confusion []                                                                                  EYES: glasses[]  blurry vision[]  discharge[] pain[] glaucoma []                                                                            ENMT:  difficulty hearing []  vertigo[]  dysphagia[] epistaxis[] recent dental work []                                      CV:  chest pain[] palpitations[] SAUCEDO [] diaphoresis [] edema[]                                                                                             RESPIRATORY:  wheezing[] SOB[] cough [] sputum[] hemoptysis[]                                                                    GI:  nausea[]  vomiting []  diarrhea[] constipation [] melena []                                                                        : hematuria[ ]  dysuria[ ] urgency[] incontinence[]                                                                                              MUSCULOSKELETAL:  arthritis[ ]  joint swelling [ ] muscle weakness [ ]                                                                  SKIN/BREAST:  rash[ ] itching [ ]  hair loss[ ] masses[ ]                                                                                                PSYCH:  dementia [ ] depression [ ] anxiety[ ]                                                                                                                  HEME/LYMPH:  bruises easily[ ] enlarged lymph nodes[ ] tender lymph nodes[ ]                                                 ENDOCRINE:  cold intolerance[ ] heat intolerance[ ] polydipsia[ ]                                                                              PHYSICAL EXAM  Vital Signs Last 24 Hrs  T(C): 36.6 (28 Oct 2024 16:10), Max: 36.8 (27 Oct 2024 19:49)  T(F): 97.9 (28 Oct 2024 16:10), Max: 98.2 (27 Oct 2024 19:49)  HR: 54 (28 Oct 2024 16:10) (54 - 67)  BP: 139/67 (28 Oct 2024 16:10) (127/62 - 179/84)  BP(mean): 96 (28 Oct 2024 04:38) (96 - 109)  RR: 18 (28 Oct 2024 16:10) (17 - 18)  SpO2: 98% (28 Oct 2024 16:10) (96% - 99%)    Parameters below as of 28 Oct 2024 16:10  Patient On (Oxygen Delivery Method): room air        General: Well nourished, well developed, no acute distress.                                                         Neuro: Normal exam oriented to person/place & time with no focal motor or sensory  deficits.                    Eyes: Normal exam of conjunctiva & lids, pupils equally reactive.   ENT: Normal exam of nasal/oral mucosa with absence of cyanosis.   Neck: Normal exam of jugular veins, trachea & thyroid.   Chest: Normal lung exam with good air movement absence of wheezes, rales, or rhonchi:                                                                          CV:  Auscultation: normal [ x] S3[ ] S4[ ] Irregular [ ] Rub[ ] Clicks[ ]  Murmurs none:[ ]systolic [ ]  diastolic [ ] holosystolic [ ]  Carotids: No Bruits[x ] Other____________ Abdominal Aorta: normal [ ] nonpalpable[ ]                                                                         GI: Normal exam of abdomen, liver & spleen with no noted masses or tenderness.                                                                                              Extremities: Normal no evidence of cyanosis or deformity Edema: none[ ]trace[x ]1+[ ]2+[ ]3+[ ]4+[ ]  Lower Extremity Pulses: Right[ ] Left[ ]Varicosities[ ]  SKIN : Normal exam to inspection & palpation.                                                           LABS:                        14.3   8.29  )-----------( 180      ( 28 Oct 2024 04:43 )             42.7     10-28    137  |  102  |  16  ----------------------------<  142[H]  3.6   |  23  |  1.14    Ca    8.5      28 Oct 2024 04:44  Phos  2.2     10-28  Mg     2.1     10-28    TPro  6.4  /  Alb  3.8  /  TBili  0.9  /  DBili  x   /  AST  19  /  ALT  8[L]  /  AlkPhos  63  10-28      Urinalysis Basic - ( 28 Oct 2024 04:44 )    Color: x / Appearance: x / SG: x / pH: x  Gluc: 142 mg/dL / Ketone: x  / Bili: x / Urobili: x   Blood: x / Protein: x / Nitrite: x   Leuk Esterase: x / RBC: x / WBC x   Sq Epi: x / Non Sq Epi: x / Bacteria: x          CT< from: CT Angio Chest Aorta w/wo IV Cont (10.28.24 @ 13:19) >  FINDINGS:    HEART/VASCULATURE: 4.7 cm descending aorta. No dissection, intramural   hematoma, or penetrating atherosclerotic ulcer. Unchanged moderate   stenosis superior mesenteric artery. Patent celiac artery, renal   arteries, and inferior mesenteric artery. Mildly calcified aorta. Normal   heart size. No pericardial effusion. Coronary artery calcifications.   Opacification of bronchial arteries which are sub-4 mm in diameter.        CHEST:    LUNGS/AIRWAYS/PLEURA: Patent trachea and bronchi. Unchanged left lower   lobe 5 mm nodule. No pleural effusion.    LYMPH NODES/MEDIASTINUM: No lymphadenopathy. Small hiatal hernia.          ABDOMEN/PELVIS:    LIVER: Unremarkable.    BILIARY SYSTEM: Layering hyperdensity in the gallbladder, likely   vicarious excretion from recent CT abdomen/pelvis.    SPLEEN:Unremarkable.    PANCREAS: Unchanged 1.9 cm cystic lesion in the head.    ADRENALS: Unremarkable.    KIDNEYS/URINARY TRACT: Left renal cyst. Decompressed bladder around a   Pham catheter.    GASTROINTESTINAL TRACT: Diverticulosis. No bowel obstruction. Normal   appendix.    REPRODUCTIVE ORGANS: Enlarged prostate.    LYMPH NODES/PERITONEUM/RETROPERITONEUM: Unremarkable.        BONES/SOFT TISSUES: Unchanged subcutaneous cyst in the posterior chest   wall.      IMPRESSION:    No acute aortic syndrome.    4.7 cm ascending aorta.    Unchanged 1.9 cm cystic lesion in the pancreas. Recommend nonemergent MRI   abdomen for further evaluation.    --- End of Report ---            DORIS KO M.D., ATTENDING RADIOLOGIST  This document has been electronically signed. Oct 28 2024  1:38PM          Assessment:  84y Male presents with Urine retention, back pain CTA Neg PE however incidental finding dilated aortic root> CTS consulted        Plan:  Dr Oseguera reviewed images  No surgical invention> Followup Aortic registry   Recommend surveillance images in 6 months          
Cardiology Attending Consultation Note     Patient seen and evaluated at bedside    Chief Complaint: abd pain     HPI:  Patient is a 84 year old male with past medical history of BPH, inguinal hernia repair BL, claudication, pre- DM, multiple superficial lipoma, hemorrhage due to warfarin use, osteoarthritis, presented to the ED with complaint of abdominal pain, diarrhea, difficulty urinating. Per patient and wife, daughter at bedside, he started having difficulty urinating about half a day prior to coming to the ED, he felt bladder pressure, also had non bloody diarrhea with soft loose stools night of Oct 26. He felt his abdomen was more distended and had significant pain. His abdominal symptoms have improved since having the pham catheter placed in the ED. He has also been feeling lightheaded, and has been sleeping more than usual. He denies recent headache fever chills, vision change, hearing change, congestion, cough hemoptysis chest pain, palpitations arm pain, dyspnea dyspnea on exertion, orthopnea flank pain, weakness, numbness, any new rash, hematuria at home, dysuria. He has chronic pain in multiple joints and several superficial lipomas.  (28 Oct 2024 17:10)      PMHx:   BPH (benign prostatic hyperplasia)    Prediabetes    HLD (hyperlipidemia)        PSHx:   Status post inguinal hernia repair using synthetic patch        Allergies:  No Known Allergies      Home Meds:    Current Medications:   acetaminophen     Tablet .. 650 milliGRAM(s) Oral every 6 hours PRN  amLODIPine   Tablet 10 milliGRAM(s) Oral daily  ampicillin/sulbactam  IVPB 3 Gram(s) IV Intermittent every 6 hours  finasteride 5 milliGRAM(s) Oral daily  rosuvastatin 5 milliGRAM(s) Oral at bedtime  sodium chloride 0.9%. 1000 milliLiter(s) IV Continuous <Continuous>  tamsulosin 0.4 milliGRAM(s) Oral at bedtime      FAMILY HISTORY:  Family history of diabetes mellitus (Mother)        Social History: Personally reviewed   No tobacco, EtOH or IVDU     REVIEW OF SYSTEMS:  Constitutional:     [x ] negative [ ] fevers [ ] chills [ ] weight loss [ ] weight gain  HEENT:                  [x ] negative [ ] dry eyes [ ] eye irritation [ ] postnasal drip [ ] nasal congestion  CV:                         [ x] negative  [ ] chest pain [ ] orthopnea [ ] palpitations [ ] murmur  Resp:                     [x ] negative [ ] cough [ ] shortness of breath [ ] dyspnea [ ] wheezing [ ] sputum [ ]hemoptysis  GI:                          [ x] negative [ ] nausea [ ] vomiting [ ] diarrhea [ ] constipation [ ] abd pain [ ] dysphagia   :                        [ x] negative [ ] dysuria [ ] nocturia [ ] hematuria [ ] increased urinary frequency  Musculoskeletal: [x ] negative [ ] back pain [ ] myalgias [ ] arthralgias [ ] fracture  Skin:                       [ x] negative [ ] rash [ ] itch  Neurological:        [ x] negative [ ] headache [ ] dizziness [ ] syncope [ ] weakness [ ] numbness  Psychiatric:           [ x] negative [ ] anxiety [ ] depression  Endocrine:            [ x] negative [ ] diabetes [ ] thyroid problem  Heme/Lymph:      [ x] negative [ ] anemia [ ] bleeding problem  Allergic/Immune: [ x] negative [ ] itchy eyes [ ] nasal discharge [ ] hives [ ] angioedema    [ x] All other systems negative  [ ] Unable to assess ROS due to      Physical Exam:  T(F): 97.9 (10-28), Max: 98.2 (10-27)  HR: 54 (10-28) (54 - 67)  BP: 139/67 (10-28) (127/62 - 179/84)  RR: 18 (10-28)  SpO2: 98% (10-28)    Gen: Well appearing   HENNT: No JVP   CV: regular rate, regular rhtyhm, no murmur   Pulm: clear to auscultation bilaterally   Abdomen: Non-tender, non-distended   Ext: no edema b/l   Neuro: grossly non-focal     Cardiovascular Diagnostic Testing:      Labs: Personally reviewed                        14.3   8.29  )-----------( 180      ( 28 Oct 2024 04:43 )             42.7     10-28    137  |  102  |  16  ----------------------------<  142[H]  3.6   |  23  |  1.14    Ca    8.5      28 Oct 2024 04:44  Phos  2.2     10-28  Mg     2.1     10-28    TPro  6.4  /  Alb  3.8  /  TBili  0.9  /  DBili  x   /  AST  19  /  ALT  8[L]  /  AlkPhos  63  10-28

## 2024-10-28 NOTE — ED CDU PROVIDER SUBSEQUENT DAY NOTE - HISTORY
Patient seen at bedside in NAD.  VSS.  Patient resting comfortably without complaints. Will continue IV ABX and reassessment. Paola Alexandre PA-C

## 2024-10-28 NOTE — CONSULT NOTE ADULT - ASSESSMENT
84M w/ BPH, pre DM, and OA p/w abd pain, difficult to urinate s/p pham catheter, and delta troponin. EKG with NSR and prolonged QT. CT incidental finding of dilated aortic root at 4.7 cm. Cardiology c/s for delta troponin.     1. Elevated troponin   2. Prolonged QT   3. Dilated aortic root   4. Abd pain     -delta troponin, in the absence of cp, would not trend it further   -prolonged QT w/ abd pain and n/v, would avoid QT prolong agents   -dilated ascending aorta at 4.7 cm, CTS evaluated, no surgical intervention, outpt f/u   -bp mildly elevated, cont norvasc   -obtan TTE while inpt   -outpt cardiology f/u   -dw wife and daughter at bedside     Thank you for allowing us to participate in the care of your patient. If you have any questions or concerns please do not hesitate to contact me 24/7.     Alena Mojica MD   Interventional Cardiology and General Cardiology Attending, Gladys-NS/MOIZ  [employer] [facility]    Amb[facility]   [address] [place] [address]   For Appointment: [phone]

## 2024-10-28 NOTE — H&P ADULT - HISTORY OF PRESENT ILLNESS
Patient is a 84 year old male with past medical history of BPH, inguinal hernia repair BL, claudication, pre- DM, multiple superficial lipoma, hemorrhage due to warfarin use, osteoarthritis, presented to the ED with complaint of abdominal pain, diarrhea, difficulty urinating. Per patient and wife, daughter at bedside, he started having difficulty urinating about half a day prior to coming to the ED, he felt bladder pressure, also had non bloody diarrhea with soft loose stools night of Oct 26. He felt his abdomen was more distended and had significant pain. His abdominal symptoms have improved since having the pham catheter placed in the ED. He has also been feeling lightheaded, and has been sleeping more than usual. He denies recent headache fever chills, vision change, hearing change, congestion, cough hemoptysis chest pain, palpitations arm pain, dyspnea dyspnea on exertion, orthopnea flank pain, weakness, numbness, any new rash, hematuria at home, dysuria. He has chronic pain in multiple joints and several superficial lipomas.  Patient is a 84 year old male with past medical history of BPH, inguinal hernia repair BL, claudication, pre- DM, multiple superficial lipoma, hemorrhage due to warfarin use, osteoarthritis, presented to the ED with complaint of abdominal pain, diarrhea, difficulty urinating. Per patient and wife, daughter at bedside, he started having difficulty urinating about half a day prior to coming to the ED, he felt bladder pressure, also had non bloody diarrhea with soft loose stools x4 night of Oct 26. He felt his abdomen was more distended and had significant pain. His abdominal symptoms have improved since having the pham catheter placed in the ED. He has also been feeling lightheaded, and has been sleeping more than usual. He denies recent headache fever chills, vision change, hearing change, congestion, cough hemoptysis chest pain, palpitations arm pain, dyspnea dyspnea on exertion, orthopnea flank pain, weakness, numbness, any new rash, hematuria at home, dysuria. He has chronic pain in multiple joints and several superficial lipomas.     Chart reviewed for ED notes, specialist notes, labs, imaging.

## 2024-10-28 NOTE — H&P ADULT - ASSESSMENT
Patient is a 84 year old male with past medical history of BPH, inguinal hernia repair BL, claudication, pre- DM, multiple superficial lipoma, hemorrhage due to warfarin use, osteoarthritis, presented to the ED with complaint of abdominal pain, diarrhea, difficulty urinating. Patient is admitted for further evaluation and management of elevated troponin, dilated aorta, colitis, urinary retention.

## 2024-10-28 NOTE — ED ADULT NURSE REASSESSMENT NOTE - NSFALLHARMRISKINTERV_ED_ALL_ED

## 2024-10-28 NOTE — H&P ADULT - NSHPPHYSICALEXAM_GEN_ALL_CORE
Vital Signs Last 24 Hrs  T(C): 36.6 (28 Oct 2024 16:10), Max: 36.8 (27 Oct 2024 19:49)  T(F): 97.9 (28 Oct 2024 16:10), Max: 98.2 (27 Oct 2024 19:49)  HR: 54 (28 Oct 2024 16:10) (54 - 67)  BP: 139/67 (28 Oct 2024 16:10) (127/62 - 179/84)  BP(mean): 96 (28 Oct 2024 04:38) (96 - 109)  RR: 18 (28 Oct 2024 16:10) (17 - 18)  SpO2: 98% (28 Oct 2024 16:10) (96% - 99%)    Parameters below as of 28 Oct 2024 16:10  Patient On (Oxygen Delivery Method): room air    CONSTITUTIONAL: Well-developed, well-groomed, NAD, elderly  EYES: No conjunctival or scleral injection, non-icteric; PERRLA and symmetric  ENMT: No external nasal lesions; normal dentition;  oral mucosa with moist membranes  RESPIRATORY: Breathing comfortably; lungs CTABL  CARDIOVASCULAR: +S1S2, RRR, no M/G/R; pedal pulses full and symmetric; no peripheral edema   GASTROINTESTINAL: mild tenderness to deep palpation on LLQ,  +BS throughout, no flank tenderness   MUSCULOSKELETAL: No digital clubbing or cyanosis; no joint effusions of upper or lower extremities; normal strength and tone of extremities  SKIN: several palpable lipomas scattered over exposed extremities, neck/back   NEUROLOGIC: sensation intact in LEs b/l to light touch,  motor strength 5/5 all extremities   PSYCHIATRIC: A+O x 3; mood and affect appropriate; appropriate insight and judgment

## 2024-10-28 NOTE — ED ADULT NURSE REASSESSMENT NOTE - NS ED NURSE REASSESS COMMENT FT1
Conway catheter (16 coude) placed using sterile technique. Second RN present to confirm sterility. Draining to gravity. Secured w/ stat lock. Pt tolerated procedure well. Sterile specimens sent to lab.
Covering for primary RN Marya, emptied pham bag output of 1700ml of red urine. Pt A&Ox4, noted to remain hypertensive. 5pm antibiotics administered. EMIR Madird aware.
Pt received from NATE Felix. Pt oriented to CDU & plan of care was discussed. Pt A&O x 4. Pt in CDU for IVF, IV abx, pain or nausea control. Pt denies any abdominal pain, chest pain, SOB, dizziness or palpitations. Pt on a cardiac monitor in sinus donna, HR in 50's. V/S stable, pt afebrile,  IV in place, patent and free of signs of infiltration. Pt resting in bed. Safety & comfort measures maintained. Call bell in reach. Will continue to monitor.
Pt received from NATE Mas at 19:00hrs. Pt A&O x 4, forgetful at times. At 19:35 hrs Tele tech called and notified that patient had 4 beats of wide complexes. Pt denies any chest pain, SOB, dizziness or palpitations. V/S stable,NP Chambers notified. No interventions at this time.  Patient admitted to telemetry, report given to RN on 6 [place]. Awaiting transport
Received from [facility], awake, alert and orientedx3. Breathing easy and non labored. With indwelling catheter in situ draining blood tinged urine. Vomited once, Zofran 4 mg IVP given. /77. Denies headache, dizziness. Hydrochlorothiazide 25 mg po given. Will monitor effect. Safety and comfort maintained. Call bell within easy reach. Will continue to monitor.
Pt received from NATE Melo at 19:00hrs. Pt A&O x 4. Pt is observed in CDU for Abdominal pain, nausea. Patient vomited once this shift. Zofran IV given as ordered with good relief. Bp 179/84, pt afebrile, Pt denies chest pain, SOB, or palpitation. IV 20G Rt AC, patent and free of signs of infiltration. Conway in place draining blood tinged urine. Patient sleeping at this time. Fall and safety precautions maintained. Call bell in reach. Will continue to monitor.

## 2024-10-28 NOTE — ED CDU PROVIDER SUBSEQUENT DAY NOTE - PROGRESS NOTE DETAILS
patient seen and evaluated at bedside. found to have elevated trops of 19, 39. repeat trop this morning 35. patient noted to have prolonged qtc. due to ct findings of dilated aorta will obtain CT to r/o dissection. discussed with ED attending spoke with cardiology regarding elevation in troponin. patient with no chest pain at this time. resting comfortably. awaiting cardiology recommendations. spoke with cardiology regarding elevation in troponin. patient with no chest pain at this time. resting comfortably. awaiting cardiology eval and recommendations. patient Conway catheter noted to be leaking from the sides. nurse attempted to advance catheter but patient states feels a pressure. spoke with urology. will come see patient. seen by urology. pham adjusted. pending cardiology eval at this time. will admit patient to medicine due to + delta trop and further cardiac eval. spoke with CT surgery and will see patient inpatient. discussed with Dr. Patten.

## 2024-10-28 NOTE — CHART NOTE - NSCHARTNOTEFT_GEN_A_CORE
called to see patient with two episodes of leaking around pham  pt in NAD on my arrival  18F pham catheter in place, draining dark urine  bladder irrigated with normal saline  few small old blood clots evacuated  urine clear  no leakage or resistance noted with flushing  able to aspirate full volume of irrigant easily    leaking catheter likely secondary to intermittent obstruction from old blood clot  no further acute gu intervention needed at this time  pt ok to be discharged with pham in place

## 2024-10-28 NOTE — PATIENT PROFILE ADULT - FALL HARM RISK - RISK INTERVENTIONS

## 2024-10-28 NOTE — H&P ADULT - PROBLEM SELECTOR PLAN 2
- History of BPH  - patient with abdominal pain, distention and difficulty urinating at home.     - Pham inserted in the ED after which patient reports having relief.   - Urology team adjusted pham today bladder irrigated with normal saline, noted few small old blood clots evacuated.  - Monitor HH.  - F/U urine culture NGTD.  - Continue Flomax.   - F/U urology team for further recs.  - Strict Is/Os. - History of BPH  - patient with abdominal pain, distention and difficulty urinating at home.   - CT abdomen reporting mild circumferential bladder wall thickening which may reflect underdistention versus urinary tract infection. Trace stranding about the left ureter which may reflect ascending urinary tract infection. Left renal cyst.   - Pham inserted in the ED after which patient reports having relief.   - Urology team adjusted pham today bladder irrigated with normal saline, noted few small old blood clots evacuated.  - Monitor HH.  - UA negative. F/U urine culture NGTD.  - Continue Flomax, Finasteride.   - F/U urology team for further recs.  - Strict Is/Os.

## 2024-10-28 NOTE — H&P ADULT - PROBLEM SELECTOR PLAN 3
- patient with non bloody diarrhea x 4 episodes night of 10/26/24 at home.    - CT abdomen with  - Lactate normalized  - Continue Unasyn for now  - Check stool GI pcr, culture, c diff  - Clear liquid diet as tolerated for now  - IVF NS while PO intake inadequate   - Monitor CBC, BMP - patient with non bloody diarrhea x 4 episodes night of 10/26/24 at home.    - CT abdomen with Colonic diverticulosis with minimal stranding about diverticulum in the distal sigmoid colon which may reflect mild diverticulitis. A 1.9 cm cystic lesion at the pancreatic head.   - Lactate normalized  - Continue Unasyn for now  - Check stool GI pcr, culture, c diff  - Clear liquid diet as tolerated for now  - IVF NS while PO intake inadequate   - Monitor CBC, BMP  - Outpatient follow up for MR abdomen for further evaluation of pancreatics cyst > discussed with patient and family at bedside on admission. - patient with non bloody diarrhea x 4 episodes night of 10/26/24 at home.    - CT abdomen with Colonic diverticulosis with minimal stranding about diverticulum in the distal sigmoid colon which may reflect mild diverticulitis. A 1.9 cm cystic lesion at the pancreatic head.   - Lactate normalized  - Continue Unasyn for now  - Check stool GI pcr, culture, c diff if has diarrhea again  - Clear liquid diet as tolerated for now  - IVF NS while PO intake inadequate   - Monitor CBC, BMP  - Outpatient follow up for MR abdomen for further evaluation of pancreatic cyst > discussed with patient and family at bedside on admission.

## 2024-10-28 NOTE — ED CDU PROVIDER SUBSEQUENT DAY NOTE - DATE/TIME 3
Pediatric ENT HPI





- HPI Summary


HPI Summary: 





Sx started with sore throat yesterday morning.  Also with cough. Fever to 102. (

+) headache. (+) nausea and upset stomach.  No diarrhea, no vomiting. No fever 

today. 





- History Of Current Complaint


Chief Complaint: KCSoreThroat


Stated Complaint: FEVER,SORE THROAT


Pain Intensity: 7


Pain Scale Used: 0-10 Numeric





- Allergies/Home Medications


Allergies/Adverse Reactions: 


 Allergies











Allergy/AdvReac Type Severity Reaction Status Date / Time


 


No Known Allergies Allergy   Verified 02/15/20 12:29














Past Medical History


ENT History: 


   No: Otitis Media


Chronic Illness History: Yes: Seizures





- Immunization History


Immunizations Up to Date: Yes





Review Of Systems


All Other Systems Reviewed And Are Negative: Yes


Constitutional: Positive: Fever


Eyes: Negative: Discharge


ENT: Positive: Throat Pain.  Negative: Ear Pain, Mouth Pain


Respiratory: Positive: Cough


Gastrointestinal: Negative: Vomiting, Diarrhea


Skin: Negative: Rash


Neurological/Mental Status: Positive: Lethargy





Physical Exam


Triage Information Reviewed: Yes


Vital Signs: 


 Initial Vital Signs











Temp  97.5 F   02/15/20 12:27


 


Pulse  68   02/15/20 12:27


 


Resp  22   02/15/20 12:27


 


BP  128/67   02/15/20 12:27


 


Pulse Ox  100   02/15/20 12:27











Vital Signs Reviewed: Yes


Appearance: Well-Appearing, No Pain Distress, Well-Nourished


Eyes: Positive: Normal, Conjunctiva Clear


ENT: Positive: Pharynx normal, Nasal congestion, Nasal drainage, TMs normal.  

Negative: Pharyngeal erythema, Tonsillar swelling, Tonsillar exudate


Neck: Positive: Supple, Nontender


Respiratory: Positive: Lungs clear, Normal breath sounds, No respiratory 

distress


Cardiovascular: Positive: Normal, RRR, No Murmur


Bowel Sounds: Positive: Present


Neurological: Positive: Normal, Alert, Muscle Tone Normal


Psychological: Positive: Normal Response To Family, Age Appropriate Behavior





Diagnostics





- Laboratory


Lab Results: 





 Laboratory Results - last 24 hr











  02/15/20 02/15/20





  12:30 13:55


 


Influenza A (Rapid)   Negative


 


Influenza B (Rapid)   Negative


 


Group A Strep Rapid  Negative 














Pediatric EENT Course/Dx





- Differential Dx/Diagnosis


Differential Diagnosis/HQI/PQRI: URI


Provider Diagnosis: 


 Viral upper respiratory illness








Discharge ED





- Sign-Out/Discharge


Documenting (check all that apply): Patient Departure


All imaging exams completed and their final reports reviewed: No





- Discharge Plan


Condition: Stable


Disposition: HOME


Referrals: 


Adrian Chin MD [Primary Care Provider] - 


Additional Instructions: 


Rapid strep and flu tests are both negative. 


Recheck if you think he is doing worse, or new or concerning symptoms develop





- Billing Disposition and Condition


Condition: STABLE


Disposition: Home
28-Oct-2024 14:59

## 2024-10-28 NOTE — H&P ADULT - NSHPSOCIALHISTORY_GEN_ALL_CORE
Patient states he is independent at baseline, walks without assist, active with his hobbies, lives with his wife, quit smoking many years ago, drinks alcohol occasionally, denies any other illicit substance use.

## 2024-10-28 NOTE — H&P ADULT - PROBLEM SELECTOR PLAN 5
DVT PPX: SCDs for now given hematuria and pending surgery evaluation (patient agreeable to PPX heparin, states he has history of internal hemorrhage with warfarin in the past but it is ok for him to take heparin)   Diet: CLD  OOBTC, ambulate as tolerated. Fall precautions. Aspiration precautions. Delirium prevention measures, Promote sleep hygiene. PT evaluation. Incentive spirometry.   # Dispo: pending clinical improvement, anticipate discharge back to home, F/U CM.    # Medication reconciliation done at the time of admission per list from clinical pharmacist.    - 10/28: patient's wife and daughter updated at bedside at the time of admission.

## 2024-10-28 NOTE — CONSULT NOTE ADULT - TIME BILLING
75  minutes were spent on this encounter for extensive review of medical record details including labs and/or imaging studies and/or adjacent care team and consultant records, as well as review and reconciliation of current medications. Time was spent on obtaining a history, performing physical examination of patient, and answering patient and/or family questions regarding plan of care. Time was also spent discussing plan of care with patient’s other care team members including primary and consulting teams. Time also was spent on documentation of this encounter into the EHR.

## 2024-10-29 ENCOUNTER — TRANSCRIPTION ENCOUNTER (OUTPATIENT)
Age: 84
End: 2024-10-29

## 2024-10-29 VITALS
RESPIRATION RATE: 18 BRPM | TEMPERATURE: 98 F | SYSTOLIC BLOOD PRESSURE: 131 MMHG | OXYGEN SATURATION: 98 % | HEART RATE: 54 BPM | DIASTOLIC BLOOD PRESSURE: 70 MMHG

## 2024-10-29 LAB
A1C WITH ESTIMATED AVERAGE GLUCOSE RESULT: 6.5 % — HIGH (ref 4–5.6)
ALBUMIN SERPL ELPH-MCNC: 3.4 G/DL — SIGNIFICANT CHANGE UP (ref 3.3–5)
ALP SERPL-CCNC: 53 U/L — SIGNIFICANT CHANGE UP (ref 40–120)
ALT FLD-CCNC: 8 U/L — LOW (ref 10–45)
ANION GAP SERPL CALC-SCNC: 11 MMOL/L — SIGNIFICANT CHANGE UP (ref 5–17)
AST SERPL-CCNC: 16 U/L — SIGNIFICANT CHANGE UP (ref 10–40)
BASOPHILS # BLD AUTO: 0.02 K/UL — SIGNIFICANT CHANGE UP (ref 0–0.2)
BASOPHILS NFR BLD AUTO: 0.4 % — SIGNIFICANT CHANGE UP (ref 0–2)
BILIRUB SERPL-MCNC: 0.8 MG/DL — SIGNIFICANT CHANGE UP (ref 0.2–1.2)
BUN SERPL-MCNC: 17 MG/DL — SIGNIFICANT CHANGE UP (ref 7–23)
CALCIUM SERPL-MCNC: 8.1 MG/DL — LOW (ref 8.4–10.5)
CHLORIDE SERPL-SCNC: 105 MMOL/L — SIGNIFICANT CHANGE UP (ref 96–108)
CHOLEST SERPL-MCNC: 102 MG/DL — SIGNIFICANT CHANGE UP
CO2 SERPL-SCNC: 24 MMOL/L — SIGNIFICANT CHANGE UP (ref 22–31)
CREAT SERPL-MCNC: 1 MG/DL — SIGNIFICANT CHANGE UP (ref 0.5–1.3)
EGFR: 74 ML/MIN/1.73M2 — SIGNIFICANT CHANGE UP
EOSINOPHIL # BLD AUTO: 0.15 K/UL — SIGNIFICANT CHANGE UP (ref 0–0.5)
EOSINOPHIL NFR BLD AUTO: 3.1 % — SIGNIFICANT CHANGE UP (ref 0–6)
ESTIMATED AVERAGE GLUCOSE: 140 MG/DL — HIGH (ref 68–114)
GLUCOSE SERPL-MCNC: 119 MG/DL — HIGH (ref 70–99)
HCT VFR BLD CALC: 39.9 % — SIGNIFICANT CHANGE UP (ref 39–50)
HDLC SERPL-MCNC: 38 MG/DL — LOW
HGB BLD-MCNC: 13.4 G/DL — SIGNIFICANT CHANGE UP (ref 13–17)
IMM GRANULOCYTES NFR BLD AUTO: 0.4 % — SIGNIFICANT CHANGE UP (ref 0–0.9)
INR BLD: 1.12 RATIO — SIGNIFICANT CHANGE UP (ref 0.85–1.16)
LIPID PNL WITH DIRECT LDL SERPL: 49 MG/DL — SIGNIFICANT CHANGE UP
LYMPHOCYTES # BLD AUTO: 0.95 K/UL — LOW (ref 1–3.3)
LYMPHOCYTES # BLD AUTO: 19.6 % — SIGNIFICANT CHANGE UP (ref 13–44)
MAGNESIUM SERPL-MCNC: 2.1 MG/DL — SIGNIFICANT CHANGE UP (ref 1.6–2.6)
MCHC RBC-ENTMCNC: 32.2 PG — SIGNIFICANT CHANGE UP (ref 27–34)
MCHC RBC-ENTMCNC: 33.6 GM/DL — SIGNIFICANT CHANGE UP (ref 32–36)
MCV RBC AUTO: 95.9 FL — SIGNIFICANT CHANGE UP (ref 80–100)
MONOCYTES # BLD AUTO: 0.49 K/UL — SIGNIFICANT CHANGE UP (ref 0–0.9)
MONOCYTES NFR BLD AUTO: 10.1 % — SIGNIFICANT CHANGE UP (ref 2–14)
NEUTROPHILS # BLD AUTO: 3.21 K/UL — SIGNIFICANT CHANGE UP (ref 1.8–7.4)
NEUTROPHILS NFR BLD AUTO: 66.4 % — SIGNIFICANT CHANGE UP (ref 43–77)
NON HDL CHOLESTEROL: 64 MG/DL — SIGNIFICANT CHANGE UP
NRBC # BLD: 0 /100 WBCS — SIGNIFICANT CHANGE UP (ref 0–0)
PHOSPHATE SERPL-MCNC: 2.1 MG/DL — LOW (ref 2.5–4.5)
PLATELET # BLD AUTO: 155 K/UL — SIGNIFICANT CHANGE UP (ref 150–400)
POTASSIUM SERPL-MCNC: 3.4 MMOL/L — LOW (ref 3.5–5.3)
POTASSIUM SERPL-SCNC: 3.4 MMOL/L — LOW (ref 3.5–5.3)
PROT SERPL-MCNC: 5.6 G/DL — LOW (ref 6–8.3)
PROTHROM AB SERPL-ACNC: 12.7 SEC — SIGNIFICANT CHANGE UP (ref 9.9–13.4)
RBC # BLD: 4.16 M/UL — LOW (ref 4.2–5.8)
RBC # FLD: 12.3 % — SIGNIFICANT CHANGE UP (ref 10.3–14.5)
SODIUM SERPL-SCNC: 140 MMOL/L — SIGNIFICANT CHANGE UP (ref 135–145)
TRIGL SERPL-MCNC: 75 MG/DL — SIGNIFICANT CHANGE UP
WBC # BLD: 4.84 K/UL — SIGNIFICANT CHANGE UP (ref 3.8–10.5)
WBC # FLD AUTO: 4.84 K/UL — SIGNIFICANT CHANGE UP (ref 3.8–10.5)

## 2024-10-29 PROCEDURE — 99239 HOSP IP/OBS DSCHRG MGMT >30: CPT

## 2024-10-29 RX ORDER — AMOXICILLIN AND CLAVULANATE POTASSIUM 600; 42.9 MG/5ML; MG/5ML
875 POWDER, FOR SUSPENSION ORAL
Qty: 6 | Refills: 0
Start: 2024-10-29 | End: 2024-10-31

## 2024-10-29 RX ORDER — AMLODIPINE BESYLATE 10 MG
1 TABLET ORAL
Qty: 30 | Refills: 0
Start: 2024-10-29 | End: 2024-11-27

## 2024-10-29 RX ORDER — POTASSIUM CHLORIDE 10 MEQ
40 TABLET, EXTENDED RELEASE ORAL ONCE
Refills: 0 | Status: COMPLETED | OUTPATIENT
Start: 2024-10-29 | End: 2024-10-29

## 2024-10-29 RX ADMIN — AMPICILLIN AND SULBACTAM 200 GRAM(S): 2; 1 INJECTION, POWDER, FOR SOLUTION INTRAMUSCULAR; INTRAVENOUS at 12:33

## 2024-10-29 RX ADMIN — AMPICILLIN AND SULBACTAM 200 GRAM(S): 2; 1 INJECTION, POWDER, FOR SOLUTION INTRAMUSCULAR; INTRAVENOUS at 00:11

## 2024-10-29 RX ADMIN — SODIUM CHLORIDE 75 MILLILITER(S): 9 INJECTION, SOLUTION INTRAMUSCULAR; INTRAVENOUS; SUBCUTANEOUS at 06:57

## 2024-10-29 RX ADMIN — FINASTERIDE 5 MILLIGRAM(S): 5 TABLET, FILM COATED ORAL at 12:33

## 2024-10-29 RX ADMIN — Medication 10 MILLIGRAM(S): at 05:34

## 2024-10-29 RX ADMIN — AMPICILLIN AND SULBACTAM 200 GRAM(S): 2; 1 INJECTION, POWDER, FOR SOLUTION INTRAMUSCULAR; INTRAVENOUS at 06:50

## 2024-10-29 RX ADMIN — Medication 40 MILLIEQUIVALENT(S): at 12:33

## 2024-10-29 NOTE — PROGRESS NOTE ADULT - SUBJECTIVE AND OBJECTIVE BOX
Rocky Combs MD  Interventional Cardiology / Endovascular Specialist  [place] Office : [phone] [place] [address] [place] [address]  Tel:   [place] Office : [address] [facility] [place] [address]  Tel: [phone]    Pt lying in bed in NAD   	  MEDICATIONS:  amLODIPine   Tablet 10 milliGRAM(s) Oral daily    ampicillin/sulbactam  IVPB 3 Gram(s) IV Intermittent every 6 hours      acetaminophen     Tablet .. 650 milliGRAM(s) Oral every 6 hours PRN      finasteride 5 milliGRAM(s) Oral daily  rosuvastatin 5 milliGRAM(s) Oral at bedtime    influenza  Vaccine (HIGH DOSE) 0.5 milliLiter(s) IntraMuscular once  sodium chloride 0.9%. 1000 milliLiter(s) IV Continuous <Continuous>  tamsulosin 0.4 milliGRAM(s) Oral at bedtime      PAST MEDICAL/SURGICAL HISTORY  PAST MEDICAL & SURGICAL HISTORY:  BPH (benign prostatic hyperplasia)      Prediabetes      HLD (hyperlipidemia)      Status post inguinal hernia repair using synthetic patch          SOCIAL HISTORY: Substance Use (street drugs): ( x ) never used  (  ) other:    FAMILY HISTORY:  Family history of diabetes mellitus (Mother)        REVIEW OF SYSTEMS:  CONSTITUTIONAL: No fever, weight loss, or fatigue  EYES: No eye pain, visual disturbances, or discharge  ENMT:  No difficulty hearing, tinnitus, vertigo; No sinus or throat pain  BREASTS: No pain, masses, or nipple discharge  GASTROINTESTINAL: No abdominal or epigastric pain. No nausea, vomiting, or hematemesis; No diarrhea or constipation. No melena or hematochezia.  GENITOURINARY: No dysuria, frequency, hematuria, or incontinence  NEUROLOGICAL: No headaches, memory loss, loss of strength, numbness, or tremors  ENDOCRINE: No heat or cold intolerance; No hair loss  MUSCULOSKELETAL: No joint pain or swelling; No muscle, back, or extremity pain  PSYCHIATRIC: No depression, anxiety, mood swings, or difficulty sleeping  HEME/LYMPH: No easy bruising, or bleeding gums  All others negative    PHYSICAL EXAM:  T(C): 36.4 (10-29-24 @ 11:11), Max: 36.6 (10-28-24 @ 16:10)  HR: 55 (10-29-24 @ 11:11) (48 - 57)  BP: 128/64 (10-29-24 @ 11:11) (125/66 - 139/67)  RR: 18 (10-29-24 @ 11:11) (17 - 18)  SpO2: 97% (10-29-24 @ 11:11) (95% - 98%)  Wt(kg): --  I&O's Summary    28 Oct 2024 07:01  -  29 Oct 2024 07:00  --------------------------------------------------------  IN: 0 mL / OUT: 750 mL / NET: -750 mL    29 Oct 2024 07:01  -  29 Oct 2024 13:36  --------------------------------------------------------  IN: 200 mL / OUT: 400 mL / NET: -200 mL          GENERAL: NAD  EYES: conjunctiva and sclera clear  ENMT: No tonsillar erythema, exudates, or enlargement  Cardiovascular: Normal S1 S2, No JVD, No murmurs, No edema  Respiratory: Lungs clear to auscultation	  Gastrointestinal:  Soft, Non-tender, + BS	  Extremities: No edema                                    13.4   4.84  )-----------( 155      ( 29 Oct 2024 05:39 )             39.9     10-29    140  |  105  |  17  ----------------------------<  119[H]  3.4[L]   |  24  |  1.00    Ca    8.1[L]      29 Oct 2024 05:39  Phos  2.1     10-29  Mg     2.1     10-29    TPro  5.6[L]  /  Alb  3.4  /  TBili  0.8  /  DBili  x   /  AST  16  /  ALT  8[L]  /  AlkPhos  53  10-29    proBNP:   Lipid Profile:   HgA1c:   TSH:     Consultant(s) Notes Reviewed:  [x ] YES  [ ] NO    Care Discussed with Consultants/Other Providers [ x] YES  [ ] NO    Imaging Personally Reviewed independently:  [x] YES  [ ] NO    All labs, radiologic studies, vitals, orders and medications list reviewed. Patient is seen and examined at bedside. Case discussed with medical team.

## 2024-10-29 NOTE — DISCHARGE NOTE PROVIDER - CARE PROVIDERS DIRECT ADDRESSES
,DirectAddress_Unknown,[email or web address] ,DirectAddress_Unknown,[email or web address],DirectAddress_Unknown

## 2024-10-29 NOTE — DISCHARGE NOTE PROVIDER - NSDCMRMEDTOKEN_GEN_ALL_CORE_FT
dutasteride 0.5 mg oral capsule: 1 cap(s) orally once a day  rosuvastatin 5 mg oral tablet: 1 tab(s) orally once a day (at bedtime)  tamsulosin 0.4 mg oral capsule: 1 cap(s) orally once a day   amLODIPine 10 mg oral tablet: 1 tab(s) orally once a day  amoxicillin-clavulanate 875 mg-125 mg oral tablet: 875 milligram(s) orally 2 times a day  dutasteride 0.5 mg oral capsule: 1 cap(s) orally once a day  rosuvastatin 5 mg oral tablet: 1 tab(s) orally once a day (at bedtime)  tamsulosin 0.4 mg oral capsule: 1 cap(s) orally once a day

## 2024-10-29 NOTE — DISCHARGE NOTE PROVIDER - PROVIDER TOKENS
PROVIDER:[TOKEN:[[id number]],FOLLOWUP:[1 week]],PROVIDER:[TOKEN:[[id number]],FOLLOWUP:[1 month]] PROVIDER:[TOKEN:[[id number]],FOLLOWUP:[1 week]],PROVIDER:[TOKEN:[[id number]],FOLLOWUP:[2 months]],PROVIDER:[TOKEN:[[id number]],FOLLOWUP:[1 week]]

## 2024-10-29 NOTE — DISCHARGE NOTE PROVIDER - CARE PROVIDER_API CALL
Eliz Lewis  Family Medicine  [address] [place] [address], [address]  [place] [address]  Phone: [phone]  Fax: [phone]  Follow Up Time: 1 week    Edwin Oseguera  Thoracic and Cardiac Surgery  [address] [place] [address]  Phone: [phone]  Fax: [phone]  Follow Up Time: 1 month   Eliz Lewis  Family Medicine  [address] [place] [address], [address]  [place] [address]  Phone: [phone]  Fax: [phone]  Follow Up Time: 1 week    Edwin Oseguera  Thoracic and Cardiac Surgery  [address] [place] [id number]  Phone: [phone]  Fax: [phone]  Follow Up Time: 2 months    Rocky Combs  Interventional Cardiology  [address] [place] [id number]  Phone: [phone]  Fax: [phone]  Follow Up Time: 1 week

## 2024-10-29 NOTE — DISCHARGE NOTE PROVIDER - NSDCFUSCHEDAPPT_GEN_ALL_CORE_FT
[facility]  INTMED [address]  Scheduled Appointment: 12/18/2024     Cristian Dowling  [facility]  UROLOGY [address] [facility]  Scheduled Appointment: 11/11/2024    Eliz Lewis  [facility]  INTMED [address]  Scheduled Appointment: 12/18/2024

## 2024-10-29 NOTE — DISCHARGE NOTE PROVIDER - HOSPITAL COURSE
HPI:  Patient is a 84 year old male with past medical history of BPH, inguinal hernia repair BL, claudication, pre- DM, multiple superficial lipoma, hemorrhage due to warfarin use, osteoarthritis, presented to the ED with complaint of abdominal pain, diarrhea, difficulty urinating. Per patient and wife, daughter at bedside, he started having difficulty urinating about half a day prior to coming to the ED, he felt bladder pressure, also had non bloody diarrhea with soft loose stools x4 night of Oct 26. He felt his abdomen was more distended and had significant pain. His abdominal symptoms have improved since having the pham catheter placed in the ED. He has also been feeling lightheaded, and has been sleeping more than usual. He denies recent headache fever chills, vision change, hearing change, congestion, cough hemoptysis chest pain, palpitations arm pain, dyspnea dyspnea on exertion, orthopnea flank pain, weakness, numbness, any new rash, hematuria at home, dysuria. He has chronic pain in multiple joints and several superficial lipomas.     Chart reviewed for ED notes, specialist notes, labs, imaging. (28 Oct 2024 17:10)    Hospital Course:  Acute lower abd pain and distension due to urinary Retention - Pham placed. Urology consulted; outpt urology follow up  Patient also with Non bloody Diarrhea and Vomiting x 1   - CT abd with Colonic diverticulosis with minimal stranding about diverticulum in the distal sigmoid colon concerning for mild diverticulitis - treated with IV Unasyn transitioned to PO Augmentin to complete 5 days  Incidental finding of Ascending Aortic Aneurysm of 4.7 cm on CT  - CTA with incidental finding dilated aortic root ,CT surgery team consulted,  no surgical intervention , F/U outpatient with surveillance images in 6 months  Also finding of Pancreatic Cyst - A 1.9 cm cystic lesion at the pancreatic head on CT  Outpt follow up for MR abdomen for further evaluation of pancreatic cyst   Noted with mildly Elevated troponin 39 trended down to 35  - no CP , SOB and also  Prolonged QT  - Cardiology consulted, avoid QT prolong agents. Follow up with Cardiology in 2 - 3 days for further work - will need Echo as outpatient      Important Medication Changes and Reason:    Active or Pending Issues Requiring Follow-up:  # Urology Follow up for Urinary Retention and Pham care  # CT surgery follow up as outpatient with surveillance images in 6 months  # Follow up with PMD for Pancreatic Cyst - Outpt follow up for MR abdomen for further evaluation of pancreatic cyst   # Follow up with Cardiology in 2 - 3 days for further work - will need Echo as outpatient    Advanced Directives:   [x] Full code  [ ] DNR  [ ] Hospice    Discharge Diagnoses:  # Urinary Retention  # Diverticulitis - treated with IV Unasyn transitioned to PO Augmentin to complete 5 days  # Ascending Aortic Aneurysm of 4.7 cm on CT  #  Pancreatic Cyst - A 1.9 cm cystic lesion at the pancreatic head on CT  #  mildly Elevated troponin 39 trended down to 35  - no CP , SOB          HPI:  Patient is a 84 year old male with past medical history of BPH, inguinal hernia repair BL, claudication, pre- DM, multiple superficial lipoma, hemorrhage due to warfarin use, osteoarthritis, presented to the ED with complaint of abdominal pain, diarrhea, difficulty urinating. Per patient and wife, daughter at bedside, he started having difficulty urinating about half a day prior to coming to the ED, he felt bladder pressure, also had non bloody diarrhea with soft loose stools x4 night of Oct 26. He felt his abdomen was more distended and had significant pain. His abdominal symptoms have improved since having the pham catheter placed in the ED. He has also been feeling lightheaded, and has been sleeping more than usual. He denies recent headache fever chills, vision change, hearing change, congestion, cough hemoptysis chest pain, palpitations arm pain, dyspnea dyspnea on exertion, orthopnea flank pain, weakness, numbness, any new rash, hematuria at home, dysuria. He has chronic pain in multiple joints and several superficial lipomas.     Chart reviewed for ED notes, specialist notes, labs, imaging. (28 Oct 2024 17:10)    Hospital Course:  Acute lower abd pain and distension due to urinary Retention - Pham placed. Urology consulted; outpt urology follow up  Patient also with Non bloody Diarrhea and Vomiting x 1   - CT abd with Colonic diverticulosis with minimal stranding about diverticulum in the distal sigmoid colon concerning for mild diverticulitis - treated with IV Unasyn transitioned to PO Augmentin to complete 5 days  Incidental finding of Ascending Aortic Aneurysm of 4.7 cm on CT  - CTA with incidental finding dilated aortic root ,CT surgery team consulted,  no surgical intervention , F/U outpatient with surveillance images in 6 months  Also finding of Pancreatic Cyst - A 1.9 cm cystic lesion at the pancreatic head on CT  Outpt follow up for MR abdomen for further evaluation of pancreatic cyst   Noted with mildly Elevated troponin 39 trended down to 35  - no CP , SOB and also  Prolonged QT  - Cardiology consulted, avoid QT prolong agents. Follow up with Cardiology in 2 - 3 days for further work - will need Echo as outpatient      Important Medication Changes and Reason:    Active or Pending Issues Requiring Follow-up:  # Urology Follow up for Urinary Retention and Pham care  # CT surgery follow up as outpatient with surveillance images in 6 months  # Follow up with PMD for Pancreatic Cyst - Outpt follow up for MR abdomen for further evaluation of pancreatic cyst   # Follow up with Cardiology in 2 - 3 days for further work - will need Echo as outpatient    Advanced Directives:   [x] Full code  [ ] DNR  [ ] Hospice    Discharge Diagnoses:  # Urinary Retention  # Diverticulitis - treated with IV Unasyn transitioned to PO Augmentin to complete 5 days  # Ascending Aortic Aneurysm of 4.7 cm on CT  #  Pancreatic Cyst - A 1.9 cm cystic lesion at the pancreatic head on CT  #  mildly Elevated troponin 39 trended down to 35  - no CP , SOB       Medically cleared by Dr. Reyes to discharge patient home today

## 2024-10-29 NOTE — DISCHARGE NOTE NURSING/CASE MANAGEMENT/SOCIAL WORK - FINANCIAL ASSISTANCE
[facility] provides services at a reduced cost to those who are determined to be eligible through [facility]’s financial assistance program. Information regarding [facility]’s financial assistance program can be found by going to [email or web address] or by calling [phone].

## 2024-10-29 NOTE — DISCHARGE NOTE PROVIDER - NSFOLLOWUPCLINICS_GEN_ALL_ED_FT
[facility]  Urology  [address] [place] [address]  Phone: [phone]  Fax:   Follow Up Time: 1 week

## 2024-10-29 NOTE — DISCHARGE NOTE NURSING/CASE MANAGEMENT/SOCIAL WORK - NSDCFUADDAPPT_GEN_ALL_CORE_FT
APPTS ARE READY TO BE MADE: [x] YES    Best Family or Patient Contact (if needed):    Additional Information about above appointments (if needed):    1: Urology in 1 week  2: PMD Dr. Lewis in 1 week  3: Cardiology in 1 week    Other comments or requests:    18

## 2024-10-29 NOTE — DISCHARGE NOTE PROVIDER - NSDCCPCAREPLAN_GEN_ALL_CORE_FT
PRINCIPAL DISCHARGE DIAGNOSIS  Diagnosis: Urine retention  Assessment and Plan of Treatment: Acute lower abd pain and distension due to urinary Retention - Conway placed. Urology consulted; outpt urology follow up        SECONDARY DISCHARGE DIAGNOSES  Diagnosis: Diverticulitis  Assessment and Plan of Treatment: You reported Non bloody Diarrhea and Vomiting x 1   - CT abdomen showed Colonic diverticulosis with minimal stranding about diverticulum in the distal sigmoid colon concerning for mild diverticulitis   Treated with IV Unasyn transitioned to PO Augmentin to complete 5 days      Diagnosis: Aortic root aneurysm  Assessment and Plan of Treatment: Incidental finding of Ascending Aortic Aneurysm of 4.7 cm on CT  CT surgery team consulted,  no surgical intervention ,   Follow up with CT Surgery as outpatient - This needs to be monitored - need surveillance imaging in 6 months      Diagnosis: Pancreatic cyst  Assessment and Plan of Treatment: A Pancreatic Cyst of 1.9 cm cystic lesion at the pancreatic head seen on CT  Outpt follow up for MR abdomen for further evaluation of pancreatic cyst   Follow up with PMD dr. Lewis in 1 week      Diagnosis: Elevated troponin level  Assessment and Plan of Treatment: Noted with mildly Elevated troponin 39 trended down to 35    You denied Chest Pain or shortness of breath.   Follow up with Cardiology in 2 - 3 days for further work - will need Echo as outpatient    Diagnosis: Prolonged QT interval  Assessment and Plan of Treatment: Prolonged QT  - Cardiology consulted, avoid QT prolong agents. Follow up with Cardiology in 2 - 3 days for further work - will need Echo as outpatient     PRINCIPAL DISCHARGE DIAGNOSIS  Diagnosis: Urine retention  Assessment and Plan of Treatment: Acute lower abd pain and distension due to urinary Retention - Conway placed. Urology consulted; outpt urology follow up        SECONDARY DISCHARGE DIAGNOSES  Diagnosis: Diverticulitis  Assessment and Plan of Treatment: You reported Non bloody Diarrhea and Vomiting x 1   - CT abdomen showed Colonic diverticulosis with minimal stranding about diverticulum in the distal sigmoid colon concerning for mild diverticulitis   Treated with IV Unasyn transitioned to PO Augmentin - Take as recommended  Diet as tolerated      Diagnosis: Aortic root aneurysm  Assessment and Plan of Treatment: Incidental finding of Ascending Aortic Aneurysm of 4.7 cm on CT  CT surgery team consulted,  no surgical intervention ,   Follow up with CT Surgery as outpatient - This needs to be monitored - need surveillance imaging in 6 months      Diagnosis: Pancreatic cyst  Assessment and Plan of Treatment: A Pancreatic Cyst of 1.9 cm cystic lesion at the pancreatic head seen on CT  Outpt follow up for MR abdomen for further evaluation of pancreatic cyst   Follow up with PMD dr. Lewis in 1 week      Diagnosis: Elevated troponin level  Assessment and Plan of Treatment: Noted with mildly Elevated troponin 39 trended down to 35    You denied Chest Pain or shortness of breath.   Follow up with Cardiology in 2 - 3 days for further work - will need Echo as outpatient    Diagnosis: Prolonged QT interval  Assessment and Plan of Treatment: Prolonged QT  - Cardiology consulted, avoid QT prolong agents. Follow up with Cardiology in 2 - 3 days for further work - will need Echo as outpatient

## 2024-10-29 NOTE — PROGRESS NOTE ADULT - ASSESSMENT
84M w/ BPH, pre DM, and OA p/w abd pain, difficult to urinate s/p pham catheter, and delta troponin. EKG with NSR and prolonged QT. CT incidental finding of dilated aortic root at 4.7 cm. Cardiology c/s for delta troponin.     1. Elevated troponin - no CP , SOB echo pending     2. Prolonged QT  -prolonged QT w/ abd pain and n/v, would avoid QT prolong agents     3. Dilated aortic root -dilated ascending aorta at 4.7 cm, CTS evaluated, no surgical intervention, outpt f/u    4. Abd pain 2/2 urinary retention           
no

## 2024-10-29 NOTE — DISCHARGE NOTE PROVIDER - ATTENDING DISCHARGE PHYSICAL EXAMINATION:
T(C): 36.8 (10-29 @ 18:07), Max: 36.8 (10-29 @ 18:07)   HR: 54   BP: 131/70   RR: 18   SpO2: 98%    PHYSICAL EXAM:    CONSTITUTIONAL: NAD, well-developed, well-groomed  EYES: PERRLA; conjunctiva and sclera clear  ENMT: Moist oral mucosa, no pharyngeal injection or exudates; normal dentition  NECK: Supple, no palpable masses; no thyromegaly  RESPIRATORY: Normal respiratory effort; lungs are clear to auscultation bilaterally  CARDIOVASCULAR: Regular rate and rhythm, normal S1 and S2, no murmur/rub/gallop; No lower extremity edema; Peripheral pulses are 2+ bilaterally  ABDOMEN: Nontender to palpation, normoactive bowel sounds, no rebound/guarding; No hepatosplenomegaly  MUSCULOSKELETAL:  no clubbing or cyanosis of digits; no joint swelling or tenderness to palpation  PSYCH: A+O to person, place, and time; affect appropriate  NEUROLOGY: CN 2-12 are intact and symmetric; no gross sensory deficits   SKIN: No rashes; no palpable lesions

## 2024-10-29 NOTE — DISCHARGE NOTE NURSING/CASE MANAGEMENT/SOCIAL WORK - PATIENT PORTAL LINK FT
You can access the FollowMyHealth Patient Portal offered by [facility] by registering at the following website: [email or web address]. By joining [employer]’s FollowMyHealth portal, you will also be able to view your health information using other applications (apps) compatible with our system.

## 2024-10-29 NOTE — PROGRESS NOTE ADULT - SUBJECTIVE AND OBJECTIVE BOX
Andre Reyes, M.D.  Office: [phone]  Available thru [employer] Teams     Patient is a 84y old  Male who presents with a chief complaint of abdominal pain (28 Oct 2024 17:10)          SUBJECTIVE / OVERNIGHT EVENTS:    No acute overnight events.    ROS: ( - ) Fever, ( - )Chills,  ( - )Nausea/Vomiting, ( - ) Cough, ( - )Shortness of breath, ( - )Chest Pain    MEDICATIONS  (STANDING):  amLODIPine   Tablet 10 milliGRAM(s) Oral daily  ampicillin/sulbactam  IVPB 3 Gram(s) IV Intermittent every 6 hours  finasteride 5 milliGRAM(s) Oral daily  influenza  Vaccine (HIGH DOSE) 0.5 milliLiter(s) IntraMuscular once  potassium chloride    Tablet ER 40 milliEquivalent(s) Oral once  rosuvastatin 5 milliGRAM(s) Oral at bedtime  sodium chloride 0.9%. 1000 milliLiter(s) (75 mL/Hr) IV Continuous <Continuous>  tamsulosin 0.4 milliGRAM(s) Oral at bedtime    MEDICATIONS  (PRN):  acetaminophen     Tablet .. 650 milliGRAM(s) Oral every 6 hours PRN Temp greater or equal to 38C (100.4F), Mild Pain (1 - 3)          T(C): 36.6 (10-29 @ 04:00), Max: 36.6 (10-28 @ 12:41)   HR: 48   BP: 128/62   RR: 17   SpO2: 97%    PHYSICAL EXAM:    CONSTITUTIONAL: NAD, well-developed, well-groomed  EYES: PERRLA; conjunctiva and sclera clear  ENMT: Moist oral mucosa, no pharyngeal injection or exudates; normal dentition  NECK: Supple, no palpable masses; no thyromegaly  RESPIRATORY: Normal respiratory effort; lungs are clear to auscultation bilaterally  CARDIOVASCULAR: Regular rate and rhythm, normal S1 and S2, no murmur/rub/gallop; No lower extremity edema; Peripheral pulses are 2+ bilaterally  ABDOMEN: Nontender to palpation, normoactive bowel sounds, no rebound/guarding; No hepatosplenomegaly  MUSCULOSKELETAL:  no clubbing or cyanosis of digits; no joint swelling or tenderness to palpation  PSYCH: A+O to person, place, and time; affect appropriate  NEUROLOGY: CN 2-12 are intact and symmetric; no gross sensory deficits   SKIN: No rashes; no palpable lesions      LABS:                        13.4   4.84  )-----------( 155      ( 29 Oct 2024 05:39 )             39.9      10-29    140  |  105  |  17  ----------------------------<  119[H]  3.4[L]   |  24  |  1.00    Ca    8.1[L]      29 Oct 2024 05:39  Phos  2.1     10-29  Mg     2.1     10-29    TPro  5.6[L]  /  Alb  3.4  /  TBili  0.8  /  DBili  x   /  AST  16  /  ALT  8[L]  /  AlkPhos  53  10-29       CAPILLARY BLOOD GLUCOSE          RADIOLOGY & ADDITIONAL TESTS:    Imaging Personally Reviewed:  Consultant(s) Notes Reviewed:    Care Discussed with Consultants/Other Providers:

## 2024-10-29 NOTE — DISCHARGE NOTE PROVIDER - NSDCFUADDAPPT_GEN_ALL_CORE_FT
APPTS ARE READY TO BE MADE: [x] YES    Best Family or Patient Contact (if needed):    Additional Information about above appointments (if needed):    1:   2:   3:     Other comments or requests:    APPTS ARE READY TO BE MADE: [x] YES    Best Family or Patient Contact (if needed):    Additional Information about above appointments (if needed):    1: Urology in 1 week  2: PMD Dr. Lewis in 1 week  3: Cardiology in 1 week    Other comments or requests:    APPTS ARE READY TO BE MADE: [x] YES    Best Family or Patient Contact (if needed):    Additional Information about above appointments (if needed):    1: Urology in 1 week  2: PMD Dr. Lewis in 1 week  3: Cardiology in 1 week    Other comments or requests:   Prior to outreaching the patient, it was visible that the patient has secured a follow up appointment which was not scheduled by our team. Patient is scheduled on 11/11 at 10:50A at [address] [place] [address] with Dr. Cristian Dowling    Prior to outreaching the patient, it was visible that the patient has secured a follow up appointment which was not scheduled by our team. Patient is scheduled on 11/4 at 12:20PM at [address] [place] [address] with Dr. Eliz Lewis.    Patient informed us they already have secured a follow up appointment which is not visible on [facility]. Patient's wife informed us that appointment with Dr. Combs is on 11/11.     Patient was provided with referral details by phone or email for a [facility] provider and will coordinate the appointment on their own. Wife prefers to delay scheduling for Dr. Oseguera as referral advises to follow up in 2 months

## 2024-10-30 ENCOUNTER — NON-APPOINTMENT (OUTPATIENT)
Age: 84
End: 2024-10-30

## 2024-10-30 PROBLEM — R73.03 PREDIABETES: Chronic | Status: ACTIVE | Noted: 2024-10-27

## 2024-10-30 PROBLEM — E78.5 HYPERLIPIDEMIA, UNSPECIFIED: Chronic | Status: ACTIVE | Noted: 2024-10-27

## 2024-11-04 ENCOUNTER — APPOINTMENT (OUTPATIENT)
Dept: INTERNAL MEDICINE | Facility: CLINIC | Age: 84
End: 2024-11-04
Payer: MEDICARE

## 2024-11-04 VITALS
SYSTOLIC BLOOD PRESSURE: 159 MMHG | OXYGEN SATURATION: 97 % | DIASTOLIC BLOOD PRESSURE: 73 MMHG | HEART RATE: 67 BPM | TEMPERATURE: 98.4 F

## 2024-11-04 VITALS — DIASTOLIC BLOOD PRESSURE: 70 MMHG | SYSTOLIC BLOOD PRESSURE: 136 MMHG

## 2024-11-04 DIAGNOSIS — R94.31 ABNORMAL ELECTROCARDIOGRAM [ECG] [EKG]: ICD-10-CM

## 2024-11-04 DIAGNOSIS — Z09 ENCOUNTER FOR FOLLOW-UP EXAMINATION AFTER COMPLETED TREATMENT FOR CONDITIONS OTHER THAN MALIGNANT NEOPLASM: ICD-10-CM

## 2024-11-04 DIAGNOSIS — K86.2 CYST OF PANCREAS: ICD-10-CM

## 2024-11-04 DIAGNOSIS — I71.40 ABDOMINAL AORTIC ANEURYSM, WITHOUT RUPTURE, UNSPECIFIED: ICD-10-CM

## 2024-11-04 DIAGNOSIS — I35.8 OTHER NONRHEUMATIC AORTIC VALVE DISORDERS: ICD-10-CM

## 2024-11-04 DIAGNOSIS — I65.21 OCCLUSION AND STENOSIS OF RIGHT CAROTID ARTERY: ICD-10-CM

## 2024-11-04 PROBLEM — E07.9 THYROID MASS: Status: ACTIVE | Noted: 2024-11-04

## 2024-11-04 PROCEDURE — 99204 OFFICE O/P NEW MOD 45 MIN: CPT

## 2024-11-04 RX ORDER — AMLODIPINE BESYLATE 10 MG/1
10 TABLET ORAL
Qty: 30 | Refills: 0 | Status: ACTIVE | COMMUNITY
Start: 2024-10-29

## 2024-11-11 ENCOUNTER — APPOINTMENT (OUTPATIENT)
Dept: UROLOGY | Facility: CLINIC | Age: 84
End: 2024-11-11
Payer: MEDICARE

## 2024-11-11 ENCOUNTER — OUTPATIENT (OUTPATIENT)
Dept: OUTPATIENT SERVICES | Facility: HOSPITAL | Age: 84
LOS: 1 days | End: 2024-11-11
Payer: MEDICARE

## 2024-11-11 VITALS — DIASTOLIC BLOOD PRESSURE: 75 MMHG | OXYGEN SATURATION: 99 % | SYSTOLIC BLOOD PRESSURE: 151 MMHG | HEART RATE: 52 BPM

## 2024-11-11 DIAGNOSIS — R33.8 OTHER RETENTION OF URINE: ICD-10-CM

## 2024-11-11 DIAGNOSIS — N40.1 BENIGN PROSTATIC HYPERPLASIA WITH LOWER URINARY TRACT SYMPMS: ICD-10-CM

## 2024-11-11 DIAGNOSIS — Z98.89 OTHER SPECIFIED POSTPROCEDURAL STATES: Chronic | ICD-10-CM

## 2024-11-11 DIAGNOSIS — N13.8 BENIGN PROSTATIC HYPERPLASIA WITH LOWER URINARY TRACT SYMPMS: ICD-10-CM

## 2024-11-11 PROBLEM — I71.40 AAA (ABDOMINAL AORTIC ANEURYSM): Status: ACTIVE | Noted: 2024-11-11

## 2024-11-11 PROBLEM — Z09 HOSPITAL DISCHARGE FOLLOW-UP: Status: ACTIVE | Noted: 2024-11-11

## 2024-11-11 PROCEDURE — 51700 IRRIGATION OF BLADDER: CPT

## 2024-11-11 PROCEDURE — 99214 OFFICE O/P EST MOD 30 MIN: CPT | Mod: 25

## 2024-11-12 ENCOUNTER — TRANSCRIPTION ENCOUNTER (OUTPATIENT)
Age: 84
End: 2024-11-12

## 2024-11-12 ENCOUNTER — APPOINTMENT (OUTPATIENT)
Dept: CARDIOLOGY | Facility: CLINIC | Age: 84
End: 2024-11-12

## 2024-11-12 DIAGNOSIS — N40.1 BENIGN PROSTATIC HYPERPLASIA WITH LOWER URINARY TRACT SYMPTOMS: ICD-10-CM

## 2024-11-12 DIAGNOSIS — R33.8 OTHER RETENTION OF URINE: ICD-10-CM

## 2024-11-20 PROCEDURE — 51700 IRRIGATION OF BLADDER: CPT

## 2024-11-26 PROCEDURE — 84100 ASSAY OF PHOSPHORUS: CPT

## 2024-11-26 PROCEDURE — 85014 HEMATOCRIT: CPT

## 2024-11-26 PROCEDURE — 74174 CTA ABD&PLVS W/CONTRAST: CPT | Mod: MC

## 2024-11-26 PROCEDURE — 82947 ASSAY GLUCOSE BLOOD QUANT: CPT

## 2024-11-26 PROCEDURE — 84484 ASSAY OF TROPONIN QUANT: CPT

## 2024-11-26 PROCEDURE — 76770 US EXAM ABDO BACK WALL COMP: CPT

## 2024-11-26 PROCEDURE — 82330 ASSAY OF CALCIUM: CPT

## 2024-11-26 PROCEDURE — 84132 ASSAY OF SERUM POTASSIUM: CPT

## 2024-11-26 PROCEDURE — 83605 ASSAY OF LACTIC ACID: CPT

## 2024-11-26 PROCEDURE — 84295 ASSAY OF SERUM SODIUM: CPT

## 2024-11-26 PROCEDURE — 51700 IRRIGATION OF BLADDER: CPT

## 2024-11-26 PROCEDURE — 85610 PROTHROMBIN TIME: CPT

## 2024-11-26 PROCEDURE — 85027 COMPLETE CBC AUTOMATED: CPT

## 2024-11-26 PROCEDURE — 80053 COMPREHEN METABOLIC PANEL: CPT

## 2024-11-26 PROCEDURE — 51702 INSERT TEMP BLADDER CATH: CPT

## 2024-11-26 PROCEDURE — 99285 EMERGENCY DEPT VISIT HI MDM: CPT

## 2024-11-26 PROCEDURE — 87086 URINE CULTURE/COLONY COUNT: CPT

## 2024-11-26 PROCEDURE — 71275 CT ANGIOGRAPHY CHEST: CPT | Mod: MC

## 2024-11-26 PROCEDURE — 81001 URINALYSIS AUTO W/SCOPE: CPT

## 2024-11-26 PROCEDURE — 82803 BLOOD GASES ANY COMBINATION: CPT

## 2024-11-26 PROCEDURE — 82435 ASSAY OF BLOOD CHLORIDE: CPT

## 2024-11-26 PROCEDURE — 85018 HEMOGLOBIN: CPT

## 2024-11-26 PROCEDURE — 85025 COMPLETE CBC W/AUTO DIFF WBC: CPT

## 2024-11-26 PROCEDURE — 83690 ASSAY OF LIPASE: CPT

## 2024-11-26 PROCEDURE — 80061 LIPID PANEL: CPT

## 2024-11-26 PROCEDURE — 96374 THER/PROPH/DIAG INJ IV PUSH: CPT

## 2024-11-26 PROCEDURE — 74177 CT ABD & PELVIS W/CONTRAST: CPT | Mod: MC

## 2024-11-26 PROCEDURE — 83735 ASSAY OF MAGNESIUM: CPT

## 2024-11-26 PROCEDURE — 83036 HEMOGLOBIN GLYCOSYLATED A1C: CPT

## 2024-12-16 ENCOUNTER — APPOINTMENT (OUTPATIENT)
Dept: UROLOGY | Facility: CLINIC | Age: 84
End: 2024-12-16
Payer: MEDICARE

## 2024-12-16 PROCEDURE — G2211 COMPLEX E/M VISIT ADD ON: CPT

## 2024-12-16 PROCEDURE — 51798 US URINE CAPACITY MEASURE: CPT

## 2024-12-16 PROCEDURE — 99214 OFFICE O/P EST MOD 30 MIN: CPT

## 2024-12-18 ENCOUNTER — APPOINTMENT (OUTPATIENT)
Dept: INTERNAL MEDICINE | Facility: CLINIC | Age: 84
End: 2024-12-18
Payer: MEDICARE

## 2024-12-18 VITALS
BODY MASS INDEX: 22.48 KG/M2 | HEIGHT: 72 IN | WEIGHT: 166 LBS | OXYGEN SATURATION: 97 % | SYSTOLIC BLOOD PRESSURE: 128 MMHG | DIASTOLIC BLOOD PRESSURE: 73 MMHG | TEMPERATURE: 97.5 F | RESPIRATION RATE: 16 BRPM | HEART RATE: 54 BPM

## 2024-12-18 DIAGNOSIS — R22.1 LOCALIZED SWELLING, MASS AND LUMP, NECK: ICD-10-CM

## 2024-12-18 DIAGNOSIS — N13.8 BENIGN PROSTATIC HYPERPLASIA WITH LOWER URINARY TRACT SYMPMS: ICD-10-CM

## 2024-12-18 DIAGNOSIS — Z86.39 PERSONAL HISTORY OF OTHER ENDOCRINE, NUTRITIONAL AND METABOLIC DISEASE: ICD-10-CM

## 2024-12-18 DIAGNOSIS — I71.40 ABDOMINAL AORTIC ANEURYSM, WITHOUT RUPTURE, UNSPECIFIED: ICD-10-CM

## 2024-12-18 DIAGNOSIS — Z00.00 ENCOUNTER FOR GENERAL ADULT MEDICAL EXAMINATION W/OUT ABNORMAL FINDINGS: ICD-10-CM

## 2024-12-18 DIAGNOSIS — E78.5 HYPERLIPIDEMIA, UNSPECIFIED: ICD-10-CM

## 2024-12-18 DIAGNOSIS — E07.9 DISORDER OF THYROID, UNSPECIFIED: ICD-10-CM

## 2024-12-18 DIAGNOSIS — K86.2 CYST OF PANCREAS: ICD-10-CM

## 2024-12-18 DIAGNOSIS — N40.1 BENIGN PROSTATIC HYPERPLASIA WITH LOWER URINARY TRACT SYMPMS: ICD-10-CM

## 2024-12-18 PROCEDURE — 99214 OFFICE O/P EST MOD 30 MIN: CPT | Mod: 25

## 2024-12-18 PROCEDURE — 36415 COLL VENOUS BLD VENIPUNCTURE: CPT

## 2024-12-18 PROCEDURE — G0439: CPT

## 2024-12-18 RX ORDER — LISINOPRIL 5 MG/1
5 TABLET ORAL
Qty: 30 | Refills: 0 | Status: ACTIVE | COMMUNITY
Start: 2024-11-08

## 2024-12-20 DIAGNOSIS — D64.9 ANEMIA, UNSPECIFIED: ICD-10-CM

## 2024-12-23 PROBLEM — E78.5 HLD (HYPERLIPIDEMIA): Status: ACTIVE | Noted: 2024-12-23

## 2024-12-23 PROBLEM — Z86.39 HISTORY OF HYPERLIPIDEMIA: Status: RESOLVED | Noted: 2019-11-27 | Resolved: 2024-12-23

## 2024-12-23 LAB
25(OH)D3 SERPL-MCNC: 67.4 NG/ML
ALBUMIN SERPL ELPH-MCNC: 4.4 G/DL
ALP BLD-CCNC: 67 U/L
ALT SERPL-CCNC: 11 U/L
ANION GAP SERPL CALC-SCNC: 10 MMOL/L
AST SERPL-CCNC: 18 U/L
BILIRUB SERPL-MCNC: 0.6 MG/DL
BUN SERPL-MCNC: 20 MG/DL
CALCIUM SERPL-MCNC: 9.3 MG/DL
CHLORIDE SERPL-SCNC: 101 MMOL/L
CHOLEST SERPL-MCNC: 119 MG/DL
CO2 SERPL-SCNC: 25 MMOL/L
CREAT SERPL-MCNC: 0.98 MG/DL
EGFR: 76 ML/MIN/1.73M2
ESTIMATED AVERAGE GLUCOSE: 146 MG/DL
FERRITIN SERPL-MCNC: 251 NG/ML
FOLATE SERPL-MCNC: 6.8 NG/ML
GLUCOSE SERPL-MCNC: 105 MG/DL
HBA1C MFR BLD HPLC: 6.7 %
HCT VFR BLD CALC: 39.1 %
HDLC SERPL-MCNC: 47 MG/DL
HGB BLD-MCNC: 12.9 G/DL
IRON SATN MFR SERPL: 47 %
IRON SERPL-MCNC: 128 UG/DL
LDLC SERPL CALC-MCNC: 61 MG/DL
MCHC RBC-ENTMCNC: 31.2 PG
MCHC RBC-ENTMCNC: 33 G/DL
MCV RBC AUTO: 94.7 FL
NONHDLC SERPL-MCNC: 72 MG/DL
PLATELET # BLD AUTO: 179 K/UL
POTASSIUM SERPL-SCNC: 4.2 MMOL/L
PROT SERPL-MCNC: 6.5 G/DL
RBC # BLD: 4.13 M/UL
RBC # FLD: 12.3 %
SODIUM SERPL-SCNC: 136 MMOL/L
TIBC SERPL-MCNC: 274 UG/DL
TRANSFERRIN SERPL-MCNC: 214 MG/DL
TRIGL SERPL-MCNC: 47 MG/DL
TSH SERPL-ACNC: 3.89 UIU/ML
UIBC SERPL-MCNC: 146 UG/DL
VIT B12 SERPL-MCNC: 469 PG/ML
WBC # FLD AUTO: 4.58 K/UL

## 2025-03-04 ENCOUNTER — APPOINTMENT (OUTPATIENT)
Dept: CARDIOLOGY | Facility: CLINIC | Age: 85
End: 2025-03-04
Payer: MEDICARE

## 2025-03-04 ENCOUNTER — NON-APPOINTMENT (OUTPATIENT)
Age: 85
End: 2025-03-04

## 2025-03-04 VITALS
BODY MASS INDEX: 21.94 KG/M2 | WEIGHT: 162 LBS | DIASTOLIC BLOOD PRESSURE: 82 MMHG | SYSTOLIC BLOOD PRESSURE: 132 MMHG | HEIGHT: 72 IN

## 2025-03-04 DIAGNOSIS — R00.1 BRADYCARDIA, UNSPECIFIED: ICD-10-CM

## 2025-03-04 PROCEDURE — 93000 ELECTROCARDIOGRAM COMPLETE: CPT

## 2025-03-04 PROCEDURE — 99214 OFFICE O/P EST MOD 30 MIN: CPT

## 2025-03-04 PROCEDURE — G2211 COMPLEX E/M VISIT ADD ON: CPT

## 2025-03-11 ENCOUNTER — APPOINTMENT (OUTPATIENT)
Dept: INTERNAL MEDICINE | Facility: CLINIC | Age: 85
End: 2025-03-11
Payer: MEDICARE

## 2025-03-11 VITALS
WEIGHT: 162 LBS | DIASTOLIC BLOOD PRESSURE: 56 MMHG | HEART RATE: 55 BPM | HEIGHT: 72 IN | TEMPERATURE: 94.7 F | OXYGEN SATURATION: 97 % | BODY MASS INDEX: 21.94 KG/M2 | SYSTOLIC BLOOD PRESSURE: 95 MMHG

## 2025-03-11 VITALS — SYSTOLIC BLOOD PRESSURE: 118 MMHG | DIASTOLIC BLOOD PRESSURE: 68 MMHG

## 2025-03-11 DIAGNOSIS — E07.9 DISORDER OF THYROID, UNSPECIFIED: ICD-10-CM

## 2025-03-11 DIAGNOSIS — I71.40 ABDOMINAL AORTIC ANEURYSM, WITHOUT RUPTURE, UNSPECIFIED: ICD-10-CM

## 2025-03-11 DIAGNOSIS — R22.1 LOCALIZED SWELLING, MASS AND LUMP, NECK: ICD-10-CM

## 2025-03-11 DIAGNOSIS — D64.9 ANEMIA, UNSPECIFIED: ICD-10-CM

## 2025-03-11 DIAGNOSIS — I10 ESSENTIAL (PRIMARY) HYPERTENSION: ICD-10-CM

## 2025-03-11 DIAGNOSIS — E78.5 HYPERLIPIDEMIA, UNSPECIFIED: ICD-10-CM

## 2025-03-11 DIAGNOSIS — K86.2 CYST OF PANCREAS: ICD-10-CM

## 2025-03-11 DIAGNOSIS — E11.9 TYPE 2 DIABETES MELLITUS W/OUT COMPLICATIONS: ICD-10-CM

## 2025-03-11 PROCEDURE — 99214 OFFICE O/P EST MOD 30 MIN: CPT

## 2025-03-11 PROCEDURE — 36415 COLL VENOUS BLD VENIPUNCTURE: CPT

## 2025-03-11 PROCEDURE — G2211 COMPLEX E/M VISIT ADD ON: CPT

## 2025-03-11 RX ORDER — LISINOPRIL 20 MG/1
20 TABLET ORAL
Qty: 90 | Refills: 0 | Status: ACTIVE | COMMUNITY
Start: 2025-03-11

## 2025-03-11 RX ORDER — METOPROLOL SUCCINATE 25 MG/1
25 TABLET, EXTENDED RELEASE ORAL DAILY
Qty: 90 | Refills: 0 | Status: ACTIVE | COMMUNITY
Start: 2025-03-11

## 2025-03-12 LAB
ALBUMIN SERPL ELPH-MCNC: 4.4 G/DL
ALP BLD-CCNC: 69 U/L
ALT SERPL-CCNC: 15 U/L
ANION GAP SERPL CALC-SCNC: 12 MMOL/L
AST SERPL-CCNC: 21 U/L
BILIRUB SERPL-MCNC: 0.7 MG/DL
BUN SERPL-MCNC: 21 MG/DL
CALCIUM SERPL-MCNC: 9.7 MG/DL
CHLORIDE SERPL-SCNC: 104 MMOL/L
CHOLEST SERPL-MCNC: 126 MG/DL
CO2 SERPL-SCNC: 24 MMOL/L
CREAT SERPL-MCNC: 1.09 MG/DL
CREAT SPEC-SCNC: 171 MG/DL
EGFRCR SERPLBLD CKD-EPI 2021: 67 ML/MIN/1.73M2
ESTIMATED AVERAGE GLUCOSE: 154 MG/DL
GLUCOSE SERPL-MCNC: 154 MG/DL
HBA1C MFR BLD HPLC: 7 %
HCT VFR BLD CALC: 38.9 %
HDLC SERPL-MCNC: 45 MG/DL
HGB BLD-MCNC: 12.8 G/DL
LDLC SERPL CALC-MCNC: 69 MG/DL
MCHC RBC-ENTMCNC: 31.8 PG
MCHC RBC-ENTMCNC: 32.9 G/DL
MCV RBC AUTO: 96.5 FL
MICROALBUMIN 24H UR DL<=1MG/L-MCNC: <1.2 MG/DL
MICROALBUMIN/CREAT 24H UR-RTO: NORMAL MG/G
NONHDLC SERPL-MCNC: 81 MG/DL
PLATELET # BLD AUTO: 193 K/UL
POTASSIUM SERPL-SCNC: 4.5 MMOL/L
PROT SERPL-MCNC: 6.4 G/DL
RBC # BLD: 4.03 M/UL
RBC # FLD: 12.4 %
SODIUM SERPL-SCNC: 140 MMOL/L
TRIGL SERPL-MCNC: 55 MG/DL
WBC # FLD AUTO: 4.23 K/UL

## 2025-03-19 NOTE — ED ADULT NURSE NOTE - DATE OF LAST VACCINATION
Comment: BSA 10%. Patient showing some improvement on Nemluvio. Render Risk Assessment In Note?: no Detail Level: Simple 21-Nov-2021

## 2025-03-25 ENCOUNTER — NON-APPOINTMENT (OUTPATIENT)
Age: 85
End: 2025-03-25

## 2025-03-25 ENCOUNTER — APPOINTMENT (OUTPATIENT)
Dept: ULTRASOUND IMAGING | Facility: CLINIC | Age: 85
End: 2025-03-25
Payer: MEDICARE

## 2025-03-25 ENCOUNTER — APPOINTMENT (OUTPATIENT)
Dept: MRI IMAGING | Facility: CLINIC | Age: 85
End: 2025-03-25
Payer: MEDICARE

## 2025-03-25 ENCOUNTER — OUTPATIENT (OUTPATIENT)
Dept: OUTPATIENT SERVICES | Facility: HOSPITAL | Age: 85
LOS: 1 days | End: 2025-03-25
Payer: MEDICARE

## 2025-03-25 ENCOUNTER — APPOINTMENT (OUTPATIENT)
Dept: INTERNAL MEDICINE | Facility: CLINIC | Age: 85
End: 2025-03-25
Payer: MEDICARE

## 2025-03-25 VITALS
OXYGEN SATURATION: 100 % | HEIGHT: 72 IN | HEART RATE: 54 BPM | WEIGHT: 162 LBS | DIASTOLIC BLOOD PRESSURE: 62 MMHG | TEMPERATURE: 96.4 F | SYSTOLIC BLOOD PRESSURE: 127 MMHG | BODY MASS INDEX: 21.94 KG/M2

## 2025-03-25 DIAGNOSIS — Z98.89 OTHER SPECIFIED POSTPROCEDURAL STATES: Chronic | ICD-10-CM

## 2025-03-25 DIAGNOSIS — Z01.818 ENCOUNTER FOR OTHER PREPROCEDURAL EXAMINATION: ICD-10-CM

## 2025-03-25 DIAGNOSIS — E07.9 DISORDER OF THYROID, UNSPECIFIED: ICD-10-CM

## 2025-03-25 PROCEDURE — 93000 ELECTROCARDIOGRAM COMPLETE: CPT

## 2025-03-25 PROCEDURE — 74181 MRI ABDOMEN W/O CONTRAST: CPT

## 2025-03-25 PROCEDURE — 76536 US EXAM OF HEAD AND NECK: CPT | Mod: 26

## 2025-03-25 PROCEDURE — 74181 MRI ABDOMEN W/O CONTRAST: CPT | Mod: 26

## 2025-03-25 PROCEDURE — 76536 US EXAM OF HEAD AND NECK: CPT

## 2025-03-25 PROCEDURE — G2211 COMPLEX E/M VISIT ADD ON: CPT

## 2025-03-25 PROCEDURE — 99214 OFFICE O/P EST MOD 30 MIN: CPT

## 2025-04-02 DIAGNOSIS — D49.0 NEOPLASM OF UNSPECIFIED BEHAVIOR OF DIGESTIVE SYSTEM: ICD-10-CM

## 2025-04-02 DIAGNOSIS — N28.1 CYST OF KIDNEY, ACQUIRED: ICD-10-CM

## 2025-04-02 DIAGNOSIS — K44.9 DIAPHRAGMATIC HERNIA W/OUT OBSTRUCTION OR GANGRENE: ICD-10-CM

## 2025-04-03 DIAGNOSIS — I71.9 AORTIC ANEURYSM OF UNSPECIFIED SITE, W/OUT RUPTURE: ICD-10-CM

## 2025-04-09 ENCOUNTER — RESULT REVIEW (OUTPATIENT)
Age: 85
End: 2025-04-09

## 2025-04-15 ENCOUNTER — NON-APPOINTMENT (OUTPATIENT)
Age: 85
End: 2025-04-15

## 2025-04-16 ENCOUNTER — APPOINTMENT (OUTPATIENT)
Dept: GASTROENTEROLOGY | Facility: CLINIC | Age: 85
End: 2025-04-16
Payer: MEDICARE

## 2025-04-16 VITALS
HEART RATE: 47 BPM | SYSTOLIC BLOOD PRESSURE: 139 MMHG | OXYGEN SATURATION: 99 % | DIASTOLIC BLOOD PRESSURE: 74 MMHG | TEMPERATURE: 97.7 F

## 2025-04-16 PROCEDURE — 99204 OFFICE O/P NEW MOD 45 MIN: CPT

## 2025-04-16 PROCEDURE — G2211 COMPLEX E/M VISIT ADD ON: CPT

## 2025-04-16 RX ORDER — MULTIVIT WITH MIN/ALA/HERBS 50 MG
TABLET ORAL
Refills: 0 | Status: ACTIVE | COMMUNITY

## 2025-04-23 NOTE — ED CDU PROVIDER SUBSEQUENT DAY NOTE - CARDIAC, MLM
You were given a 1 time dose of a medication called Toradol which is an NSAID and is effective for headaches in large sample of patients who has taken the medication to treat migraines.  If you do not have any relief from getting this medication in about 2 hours. Then start the steroids as was prescribed along with taking Tylenol.  Would recommend following up with Neurology as you do report headaches frequently throughout your life.      Follow up with Primary Care Provider in 3-5 days if not improving.  Proceed to Emergency Department if symptoms worsen.    If tests have been performed at [facility], our office will contact you with results if changes need to be made to the care plan discussed with you at the visit.  You can review your full results on [facility] MyChart.   Normal rate, regular rhythm.  Heart sounds S1, S2.  No murmurs, rubs or gallops.

## 2025-05-08 ENCOUNTER — APPOINTMENT (OUTPATIENT)
Dept: UROLOGY | Facility: CLINIC | Age: 85
End: 2025-05-08

## 2025-05-08 ENCOUNTER — APPOINTMENT (OUTPATIENT)
Dept: CARDIOTHORACIC SURGERY | Facility: CLINIC | Age: 85
End: 2025-05-08

## 2025-05-08 DIAGNOSIS — N13.8 BENIGN PROSTATIC HYPERPLASIA WITH LOWER URINARY TRACT SYMPMS: ICD-10-CM

## 2025-05-08 DIAGNOSIS — I71.9 AORTIC ANEURYSM OF UNSPECIFIED SITE, W/OUT RUPTURE: ICD-10-CM

## 2025-05-08 DIAGNOSIS — E78.5 HYPERLIPIDEMIA, UNSPECIFIED: ICD-10-CM

## 2025-05-08 DIAGNOSIS — I10 ESSENTIAL (PRIMARY) HYPERTENSION: ICD-10-CM

## 2025-05-08 DIAGNOSIS — K86.2 CYST OF PANCREAS: ICD-10-CM

## 2025-05-08 DIAGNOSIS — N40.1 BENIGN PROSTATIC HYPERPLASIA WITH LOWER URINARY TRACT SYMPMS: ICD-10-CM

## 2025-05-08 DIAGNOSIS — I71.40 ABDOMINAL AORTIC ANEURYSM, WITHOUT RUPTURE, UNSPECIFIED: ICD-10-CM

## 2025-05-08 DIAGNOSIS — E11.9 TYPE 2 DIABETES MELLITUS W/OUT COMPLICATIONS: ICD-10-CM

## 2025-07-15 ENCOUNTER — APPOINTMENT (OUTPATIENT)
Dept: CARDIOLOGY | Facility: CLINIC | Age: 85
End: 2025-07-15
Payer: MEDICARE

## 2025-07-15 VITALS
SYSTOLIC BLOOD PRESSURE: 130 MMHG | WEIGHT: 154 LBS | DIASTOLIC BLOOD PRESSURE: 72 MMHG | BODY MASS INDEX: 20.89 KG/M2 | OXYGEN SATURATION: 98 % | HEART RATE: 68 BPM

## 2025-07-15 PROCEDURE — G2211 COMPLEX E/M VISIT ADD ON: CPT

## 2025-07-15 PROCEDURE — 99214 OFFICE O/P EST MOD 30 MIN: CPT

## 2025-07-15 PROCEDURE — 93306 TTE W/DOPPLER COMPLETE: CPT
